# Patient Record
Sex: FEMALE | Race: WHITE | NOT HISPANIC OR LATINO | Employment: OTHER | URBAN - NONMETROPOLITAN AREA
[De-identification: names, ages, dates, MRNs, and addresses within clinical notes are randomized per-mention and may not be internally consistent; named-entity substitution may affect disease eponyms.]

---

## 2018-02-08 ENCOUNTER — OFFICE VISIT CONVERTED (OUTPATIENT)
Dept: FAMILY MEDICINE CLINIC | Age: 83
End: 2018-02-08
Attending: FAMILY MEDICINE

## 2018-06-25 ENCOUNTER — OFFICE VISIT CONVERTED (OUTPATIENT)
Dept: FAMILY MEDICINE CLINIC | Age: 83
End: 2018-06-25
Attending: FAMILY MEDICINE

## 2018-12-18 ENCOUNTER — OFFICE VISIT CONVERTED (OUTPATIENT)
Dept: FAMILY MEDICINE CLINIC | Age: 83
End: 2018-12-18
Attending: FAMILY MEDICINE

## 2018-12-18 ENCOUNTER — CONVERSION ENCOUNTER (OUTPATIENT)
Dept: OTHER | Facility: HOSPITAL | Age: 83
End: 2018-12-18

## 2018-12-20 ENCOUNTER — OFFICE VISIT CONVERTED (OUTPATIENT)
Dept: FAMILY MEDICINE CLINIC | Age: 83
End: 2018-12-20
Attending: NURSE PRACTITIONER

## 2018-12-31 ENCOUNTER — OFFICE VISIT CONVERTED (OUTPATIENT)
Dept: FAMILY MEDICINE CLINIC | Age: 83
End: 2018-12-31
Attending: FAMILY MEDICINE

## 2019-06-10 ENCOUNTER — HOSPITAL ENCOUNTER (OUTPATIENT)
Dept: OTHER | Facility: HOSPITAL | Age: 84
Discharge: HOME | End: 2019-06-10
Attending: FAMILY MEDICINE

## 2019-06-10 ENCOUNTER — OFFICE VISIT CONVERTED (OUTPATIENT)
Dept: FAMILY MEDICINE CLINIC | Age: 84
End: 2019-06-10
Attending: FAMILY MEDICINE

## 2019-06-10 LAB
ALBUMIN SERPL-MCNC: 4.4 G/DL (ref 3.5–5)
ALBUMIN/GLOB SERPL: 1.6 {RATIO} (ref 1.4–2.6)
ALP SERPL-CCNC: 71 U/L (ref 43–160)
ALT SERPL-CCNC: 21 U/L (ref 10–40)
ANION GAP SERPL CALC-SCNC: 14 MMOL/L (ref 8–19)
AST SERPL-CCNC: 19 U/L (ref 15–50)
BILIRUB SERPL-MCNC: 0.71 MG/DL (ref 0.2–1.3)
BUN SERPL-MCNC: 15 MG/DL (ref 5–25)
BUN/CREAT SERPL: 18 {RATIO} (ref 6–20)
CALCIUM SERPL-MCNC: 9.8 MG/DL (ref 8.7–10.4)
CHLORIDE SERPL-SCNC: 97 MMOL/L (ref 99–111)
CHOLEST SERPL-MCNC: 142 MG/DL (ref 107–200)
CHOLEST/HDLC SERPL: 2 {RATIO} (ref 3–6)
CONV CO2: 25 MMOL/L (ref 22–32)
CONV TOTAL PROTEIN: 7.1 G/DL (ref 6.3–8.2)
CREAT UR-MCNC: 0.84 MG/DL (ref 0.5–0.9)
ERYTHROCYTE [DISTWIDTH] IN BLOOD BY AUTOMATED COUNT: 12.9 % (ref 11.5–14.5)
GFR SERPLBLD BASED ON 1.73 SQ M-ARVRAT: >60 ML/MIN/{1.73_M2}
GLOBULIN UR ELPH-MCNC: 2.7 G/DL (ref 2–3.5)
GLUCOSE SERPL-MCNC: 101 MG/DL (ref 65–99)
HBA1C MFR BLD: 13.3 G/DL (ref 12–16)
HCT VFR BLD AUTO: 38.4 % (ref 37–47)
HDLC SERPL-MCNC: 70 MG/DL (ref 40–60)
IRON SERPL-MCNC: 97 UG/DL (ref 60–170)
LDLC SERPL CALC-MCNC: 61 MG/DL (ref 70–100)
MCH RBC QN AUTO: 32.7 PG (ref 27–31)
MCHC RBC AUTO-ENTMCNC: 34.6 G/DL (ref 33–37)
MCV RBC AUTO: 94.3 FL (ref 81–99)
OSMOLALITY SERPL CALC.SUM OF ELEC: 275 MOSM/KG (ref 273–304)
PLATELET # BLD AUTO: 166 10*3/UL (ref 130–400)
PMV BLD AUTO: 9.3 FL (ref 7.4–10.4)
POTASSIUM SERPL-SCNC: 4.1 MMOL/L (ref 3.5–5.3)
RBC # BLD AUTO: 4.07 10*6/UL (ref 4.2–5.4)
SODIUM SERPL-SCNC: 132 MMOL/L (ref 135–147)
TRIGL SERPL-MCNC: 56 MG/DL (ref 40–150)
TSH SERPL-ACNC: 1.71 M[IU]/L (ref 0.27–4.2)
VLDLC SERPL-MCNC: 11 MG/DL (ref 5–37)
WBC # BLD AUTO: 4.45 10*3/UL (ref 4.8–10.8)

## 2019-09-23 ENCOUNTER — OFFICE VISIT CONVERTED (OUTPATIENT)
Dept: FAMILY MEDICINE CLINIC | Age: 84
End: 2019-09-23
Attending: NURSE PRACTITIONER

## 2019-12-20 ENCOUNTER — HOSPITAL ENCOUNTER (OUTPATIENT)
Dept: OTHER | Facility: HOSPITAL | Age: 84
Discharge: HOME | End: 2019-12-20
Attending: FAMILY MEDICINE

## 2019-12-20 LAB
ALBUMIN SERPL-MCNC: 4.2 G/DL (ref 3.5–5)
ALBUMIN/GLOB SERPL: 1.6 {RATIO} (ref 1.4–2.6)
ALP SERPL-CCNC: 75 U/L (ref 43–160)
ALT SERPL-CCNC: 27 U/L (ref 10–40)
ANION GAP SERPL CALC-SCNC: 14 MMOL/L (ref 8–19)
AST SERPL-CCNC: 21 U/L (ref 15–50)
BILIRUB SERPL-MCNC: 0.44 MG/DL (ref 0.2–1.3)
BUN SERPL-MCNC: 18 MG/DL (ref 5–25)
BUN/CREAT SERPL: 22 {RATIO} (ref 6–20)
CALCIUM SERPL-MCNC: 9.2 MG/DL (ref 8.7–10.4)
CHLORIDE SERPL-SCNC: 102 MMOL/L (ref 99–111)
CHOLEST SERPL-MCNC: 145 MG/DL (ref 107–200)
CHOLEST/HDLC SERPL: 2.4 {RATIO} (ref 3–6)
CONV CO2: 27 MMOL/L (ref 22–32)
CONV TOTAL PROTEIN: 6.8 G/DL (ref 6.3–8.2)
CREAT UR-MCNC: 0.81 MG/DL (ref 0.5–0.9)
GFR SERPLBLD BASED ON 1.73 SQ M-ARVRAT: >60 ML/MIN/{1.73_M2}
GLOBULIN UR ELPH-MCNC: 2.6 G/DL (ref 2–3.5)
GLUCOSE SERPL-MCNC: 95 MG/DL (ref 65–99)
HDLC SERPL-MCNC: 61 MG/DL (ref 40–60)
IRON SATN MFR SERPL: 19 % (ref 20–55)
IRON SERPL-MCNC: 52 UG/DL (ref 60–170)
LDLC SERPL CALC-MCNC: 75 MG/DL (ref 70–100)
OSMOLALITY SERPL CALC.SUM OF ELEC: 290 MOSM/KG (ref 273–304)
POTASSIUM SERPL-SCNC: 4 MMOL/L (ref 3.5–5.3)
SODIUM SERPL-SCNC: 139 MMOL/L (ref 135–147)
TIBC SERPL-MCNC: 277 UG/DL (ref 245–450)
TRANSFERRIN SERPL-MCNC: 194 MG/DL (ref 250–380)
TRIGL SERPL-MCNC: 44 MG/DL (ref 40–150)
TSH SERPL-ACNC: 2.9 M[IU]/L (ref 0.27–4.2)
VLDLC SERPL-MCNC: 9 MG/DL (ref 5–37)

## 2020-01-29 ENCOUNTER — HOSPITAL ENCOUNTER (OUTPATIENT)
Dept: OTHER | Facility: HOSPITAL | Age: 85
Discharge: HOME | End: 2020-01-29
Attending: FAMILY MEDICINE

## 2020-01-29 ENCOUNTER — OFFICE VISIT CONVERTED (OUTPATIENT)
Dept: FAMILY MEDICINE CLINIC | Age: 85
End: 2020-01-29
Attending: FAMILY MEDICINE

## 2020-01-29 LAB
APPEARANCE UR: CLEAR
BACTERIA UR QL AUTO: ABNORMAL
BILIRUB UR QL: NEGATIVE
CASTS URNS QL MICRO: ABNORMAL /[LPF]
COLOR UR: YELLOW
CONV LEUKOCYTE ESTERASE: ABNORMAL
CONV UROBILINOGEN IN URINE BY AUTOMATED TEST STRIP: 0.2 {EHRLICHU}/DL (ref 0.1–1)
EPI CELLS #/AREA URNS HPF: ABNORMAL /[HPF]
GLUCOSE 24H UR-MCNC: NEGATIVE MG/DL
HGB UR QL STRIP: NEGATIVE
KETONES UR QL STRIP: ABNORMAL MG/DL
MUCOUS THREADS URNS QL MICRO: ABNORMAL
NITRITE UR-MCNC: NEGATIVE MG/ML
PH UR STRIP.AUTO: 6 [PH] (ref 5–8)
PROT UR-MCNC: NEGATIVE MG/DL
RBC # BLD AUTO: ABNORMAL /[HPF]
SP GR UR STRIP: 1.02 (ref 1–1.03)
SPECIMEN SOURCE: ABNORMAL
UNIDENT CRYS URNS QL MICRO: ABNORMAL /[HPF]
WBC #/AREA URNS HPF: ABNORMAL /[HPF]

## 2020-07-30 ENCOUNTER — OFFICE VISIT CONVERTED (OUTPATIENT)
Dept: FAMILY MEDICINE CLINIC | Age: 85
End: 2020-07-30
Attending: FAMILY MEDICINE

## 2020-08-06 ENCOUNTER — OFFICE VISIT CONVERTED (OUTPATIENT)
Dept: FAMILY MEDICINE CLINIC | Age: 85
End: 2020-08-06
Attending: FAMILY MEDICINE

## 2020-08-06 ENCOUNTER — HOSPITAL ENCOUNTER (OUTPATIENT)
Dept: OTHER | Facility: HOSPITAL | Age: 85
Discharge: HOME | End: 2020-08-06
Attending: FAMILY MEDICINE

## 2020-08-06 LAB
ALBUMIN SERPL-MCNC: 4.5 G/DL (ref 3.5–5)
ALBUMIN/GLOB SERPL: 1.7 {RATIO} (ref 1.4–2.6)
ALP SERPL-CCNC: 54 U/L (ref 43–160)
ALT SERPL-CCNC: 18 U/L (ref 10–40)
ANION GAP SERPL CALC-SCNC: 20 MMOL/L (ref 8–19)
AST SERPL-CCNC: 20 U/L (ref 15–50)
BILIRUB SERPL-MCNC: 0.72 MG/DL (ref 0.2–1.3)
BUN SERPL-MCNC: 10 MG/DL (ref 5–25)
BUN/CREAT SERPL: 9 {RATIO} (ref 6–20)
CALCIUM SERPL-MCNC: 10.7 MG/DL (ref 8.7–10.4)
CHLORIDE SERPL-SCNC: 101 MMOL/L (ref 99–111)
CHOLEST SERPL-MCNC: 265 MG/DL (ref 107–200)
CHOLEST/HDLC SERPL: 3.7 {RATIO} (ref 3–6)
CONV CO2: 24 MMOL/L (ref 22–32)
CONV TOTAL PROTEIN: 7.1 G/DL (ref 6.3–8.2)
CREAT UR-MCNC: 1.06 MG/DL (ref 0.5–0.9)
ERYTHROCYTE [DISTWIDTH] IN BLOOD BY AUTOMATED COUNT: 13.1 % (ref 11.5–14.5)
GFR SERPLBLD BASED ON 1.73 SQ M-ARVRAT: 49 ML/MIN/{1.73_M2}
GLOBULIN UR ELPH-MCNC: 2.6 G/DL (ref 2–3.5)
GLUCOSE SERPL-MCNC: 98 MG/DL (ref 65–99)
HBA1C MFR BLD: 13.1 G/DL (ref 12–16)
HCT VFR BLD AUTO: 39.8 % (ref 37–47)
HDLC SERPL-MCNC: 72 MG/DL (ref 40–60)
IRON SERPL-MCNC: 94 UG/DL (ref 60–170)
LDLC SERPL CALC-MCNC: 178 MG/DL (ref 70–100)
MCH RBC QN AUTO: 32.3 PG (ref 27–31)
MCHC RBC AUTO-ENTMCNC: 32.9 G/DL (ref 33–37)
MCV RBC AUTO: 98 FL (ref 81–99)
OSMOLALITY SERPL CALC.SUM OF ELEC: 289 MOSM/KG (ref 273–304)
PLATELET # BLD AUTO: 177 10*3/UL (ref 130–400)
PMV BLD AUTO: 9 FL (ref 7.4–10.4)
POTASSIUM SERPL-SCNC: 4.5 MMOL/L (ref 3.5–5.3)
RBC # BLD AUTO: 4.06 10*6/UL (ref 4.2–5.4)
SODIUM SERPL-SCNC: 140 MMOL/L (ref 135–147)
T4 FREE SERPL-MCNC: 1.5 NG/DL (ref 0.9–1.8)
TRIGL SERPL-MCNC: 75 MG/DL (ref 40–150)
TSH SERPL-ACNC: 1.96 M[IU]/L (ref 0.27–4.2)
VIT B12 SERPL-MCNC: 646 PG/ML (ref 211–911)
VLDLC SERPL-MCNC: 15 MG/DL (ref 5–37)
WBC # BLD AUTO: 4.57 10*3/UL (ref 4.8–10.8)

## 2020-08-25 ENCOUNTER — OFFICE VISIT CONVERTED (OUTPATIENT)
Dept: FAMILY MEDICINE CLINIC | Age: 85
End: 2020-08-25
Attending: FAMILY MEDICINE

## 2020-08-25 ENCOUNTER — HOSPITAL ENCOUNTER (OUTPATIENT)
Dept: OTHER | Facility: HOSPITAL | Age: 85
Discharge: HOME | End: 2020-08-25
Attending: FAMILY MEDICINE

## 2020-08-25 LAB
APPEARANCE UR: CLEAR
BACTERIA UR QL AUTO: ABNORMAL
BILIRUB UR QL: NEGATIVE
CASTS URNS QL MICRO: ABNORMAL /[LPF]
COLOR UR: YELLOW
CONV LEUKOCYTE ESTERASE: NEGATIVE
CONV UROBILINOGEN IN URINE BY AUTOMATED TEST STRIP: 0.2 {EHRLICHU}/DL (ref 0.1–1)
EPI CELLS #/AREA URNS HPF: ABNORMAL /[HPF]
GLUCOSE 24H UR-MCNC: NEGATIVE MG/DL
HGB UR QL STRIP: NEGATIVE
KETONES UR QL STRIP: NEGATIVE MG/DL
MUCOUS THREADS URNS QL MICRO: ABNORMAL
NITRITE UR-MCNC: NEGATIVE MG/ML
PH UR STRIP.AUTO: 7 [PH] (ref 5–8)
PROT UR-MCNC: NEGATIVE MG/DL
RBC # BLD AUTO: ABNORMAL /[HPF]
SP GR UR STRIP: 1.02 (ref 1–1.03)
SPECIMEN SOURCE: ABNORMAL
UNIDENT CRYS URNS QL MICRO: ABNORMAL /[HPF]
WBC #/AREA URNS HPF: ABNORMAL /[HPF]

## 2020-09-30 ENCOUNTER — HOSPITAL ENCOUNTER (OUTPATIENT)
Dept: OTHER | Facility: HOSPITAL | Age: 85
Discharge: HOME | End: 2020-09-30
Attending: FAMILY MEDICINE

## 2020-09-30 LAB
APPEARANCE UR: CLEAR
BACTERIA UR QL AUTO: ABNORMAL
BILIRUB UR QL: NEGATIVE
CASTS URNS QL MICRO: ABNORMAL /[LPF]
COLOR UR: YELLOW
CONV LEUKOCYTE ESTERASE: NEGATIVE
CONV UROBILINOGEN IN URINE BY AUTOMATED TEST STRIP: 0.2 {EHRLICHU}/DL (ref 0.1–1)
EPI CELLS #/AREA URNS HPF: ABNORMAL /[HPF]
GLUCOSE 24H UR-MCNC: NEGATIVE MG/DL
HGB UR QL STRIP: NEGATIVE
KETONES UR QL STRIP: NEGATIVE MG/DL
MUCOUS THREADS URNS QL MICRO: ABNORMAL
NITRITE UR-MCNC: NEGATIVE MG/ML
PH UR STRIP.AUTO: 6.5 [PH] (ref 5–8)
PROT UR-MCNC: NEGATIVE MG/DL
RBC # BLD AUTO: ABNORMAL /[HPF]
SP GR UR STRIP: 1.01 (ref 1–1.03)
SPECIMEN SOURCE: ABNORMAL
UNIDENT CRYS URNS QL MICRO: ABNORMAL /[HPF]
WBC #/AREA URNS HPF: ABNORMAL /[HPF]

## 2020-10-01 LAB
ANION GAP SERPL CALC-SCNC: 16 MMOL/L (ref 8–19)
BUN SERPL-MCNC: 15 MG/DL (ref 5–25)
BUN/CREAT SERPL: 15 {RATIO} (ref 6–20)
CALCIUM SERPL-MCNC: 10.2 MG/DL (ref 8.7–10.4)
CHLORIDE SERPL-SCNC: 98 MMOL/L (ref 99–111)
CONV CO2: 26 MMOL/L (ref 22–32)
CREAT UR-MCNC: 1 MG/DL (ref 0.5–0.9)
GFR SERPLBLD BASED ON 1.73 SQ M-ARVRAT: 52 ML/MIN/{1.73_M2}
GLUCOSE SERPL-MCNC: 107 MG/DL (ref 65–99)
OSMOLALITY SERPL CALC.SUM OF ELEC: 281 MOSM/KG (ref 273–304)
POTASSIUM SERPL-SCNC: 4.6 MMOL/L (ref 3.5–5.3)
SODIUM SERPL-SCNC: 135 MMOL/L (ref 135–147)

## 2020-10-06 LAB — PTH-INTACT SERPL-MCNC: 41 PG/ML (ref 15–65)

## 2021-01-28 ENCOUNTER — OFFICE VISIT CONVERTED (OUTPATIENT)
Dept: FAMILY MEDICINE CLINIC | Age: 86
End: 2021-01-28
Attending: FAMILY MEDICINE

## 2021-01-28 ENCOUNTER — HOSPITAL ENCOUNTER (OUTPATIENT)
Dept: OTHER | Facility: HOSPITAL | Age: 86
Discharge: HOME | End: 2021-01-28
Attending: FAMILY MEDICINE

## 2021-02-02 ENCOUNTER — HOSPITAL ENCOUNTER (OUTPATIENT)
Dept: OTHER | Facility: HOSPITAL | Age: 86
Discharge: HOME | End: 2021-02-02
Attending: FAMILY MEDICINE

## 2021-04-12 ENCOUNTER — HOSPITAL ENCOUNTER (OUTPATIENT)
Dept: OTHER | Facility: HOSPITAL | Age: 86
Discharge: HOME | End: 2021-04-12
Attending: FAMILY MEDICINE

## 2021-04-12 LAB
ANION GAP SERPL CALC-SCNC: 14 MMOL/L (ref 8–19)
BUN SERPL-MCNC: 15 MG/DL (ref 5–25)
BUN/CREAT SERPL: 19 {RATIO} (ref 6–20)
CALCIUM SERPL-MCNC: 9.3 MG/DL (ref 8.7–10.4)
CHLORIDE SERPL-SCNC: 103 MMOL/L (ref 99–111)
CONV CO2: 25 MMOL/L (ref 22–32)
CREAT UR-MCNC: 0.81 MG/DL (ref 0.5–0.9)
GFR SERPLBLD BASED ON 1.73 SQ M-ARVRAT: >60 ML/MIN/{1.73_M2}
GLUCOSE SERPL-MCNC: 100 MG/DL (ref 65–99)
OSMOLALITY SERPL CALC.SUM OF ELEC: 287 MOSM/KG (ref 273–304)
POTASSIUM SERPL-SCNC: 4.3 MMOL/L (ref 3.5–5.3)
SODIUM SERPL-SCNC: 138 MMOL/L (ref 135–147)

## 2021-04-20 ENCOUNTER — HOSPITAL ENCOUNTER (OUTPATIENT)
Dept: OTHER | Facility: HOSPITAL | Age: 86
Discharge: HOME | End: 2021-04-20
Attending: FAMILY MEDICINE

## 2021-05-18 NOTE — PROGRESS NOTES
Tracy Brooks 1935     Office/Outpatient Visit    Visit Date: Mon, Jun 25, 2018 12:53 pm    Provider: Cathy Lebron MD (Assistant: Vinita Trevino MA)    Location: [facility]        Electronically signed by Cathy Lebron MD on  06/26/2018 01:18:56 PM                             SUBJECTIVE:        CC:     Ms. Brooks is a 82 year old White female.  follow up for BP/cholesterol and thyroid disease;         HPI:         Patient to be evaluated for acquired hypothyroidism.  This was first diagnosed more than 6 months ago.  She is currently taking Levothyroid, 50 mcg daily.  TSH was last checked two months ago.  The result was reported as normal.  She denies any related symptoms.  She reports no symptoms suggestive of adverse medication effect.          Additionally, she presents with history of hypertension, controlled.  current nonpharmacologic treatment includes low sodium diet.  Her current cardiac medication regimen includes a diuretic, a calcium channel blocker, and an angiotensin receptor blocker.  She has not kept a blood pressure diary, but states that pressures have been well controlled.  She is tolerating the medication well without side effects.  Compliance with treatment has been good; she takes her medication as directed.          With regard to the essential hypercholesterolemia, current treatment includes Lipitor and a low cholesterol/low fat diet.  Compliance with treatment has been good; she takes her medication as directed.  She denies experiencing any hypercholesterolemia related symptoms.      she has chronic macular degeneration and glaucoma in her R eye, she is to follow up with Dr Rosales     iron def anemia, she is off iron supplementation     she still sees Dr Jackson yearly for her h/o breast cancer, continue chronic anastrazole     ROS:     CONSTITUTIONAL:  Negative for chills and fever.      CARDIOVASCULAR:  Negative for chest pain and palpitations.      RESPIRATORY:   Negative for recent cough and dyspnea.      GASTROINTESTINAL:  Negative for abdominal pain, nausea and vomiting.      INTEGUMENTARY/BREAST:  Negative for rash.      NEUROLOGICAL:  Positive for worsening memory, yamile recalling names, h/o 2 concussions-one , other .   Negative for dizziness, headaches or weakness.      PSYCHIATRIC:  Negative for anxiety and depression.          PMH/FMH/SH:     Last Reviewed on 2018 01:30 PM by Cathy Lebron    Past Medical History: ENT-DR BARROSO, OPTHOMOLOGIST-DR AKHTAR, CARDIOLOGY-DR FREEMAN    ONCOLOGY DR SARABIA, SURGERY DR ANDERSON, GI DR GARCIA macular degeneration                 PAST MEDICAL HISTORY         Hypertension     Gastroesophageal Reflux Disease     Osteoporosis: takes fortaeo;     has three thyroid nodules     Breast cancer: dx'd in Right with mastectomy , Left with mastectomy 2013;     eyes-macular degeneration eyes b, glaucoma L eye     Hip pain     Scalp laceration     Emphysema, other     Use of high risk medications     Unspecified fall     Dizziness     Arm pain     Osteoporosis     Acquired hypothyroidism     Generalized osteoarthritis     History of breast cancer     Osteopenia     Family history of colon cancer      Essential hypercholesterolemia     chronic back pain    GERD     Thyroid nodule     Knee pain     Shoulder pain     Multiple thoracic compression fractures         GYNECOLOGICAL HISTORY:        Menopause at age 42.              ADVANCE DIRECTIVES: Living will on file, signed 16, ./pr         PREVENTIVE HEALTH MAINTENANCE             BONE DENSITY: was last done 18 with the following abnormality noted-- osteoporosis - declines Prolia     COLORECTAL CANCER SCREENING: Up to date (colonoscopy q10y; sigmoidoscopy q5y; Cologuard q3y) was last done 3/20/18, Results are in chart; colonoscopy with the following abnormalities noted-- diverticulosis     PAP SMEAR: was last done  with normal results         Surgical  History:         Colon Resection: ; diveerticulitis;     bilateral mastectomy , ;      Coronary Artery Stent Placement: 2007; CARDIOLYTE STRESS TEST-NORMAL IN          Family History:     Father:  at age 82; Cause of death was cva     Mother:  at age 81; Cause of death was colon cancer     Brother(s): 1 brother(s) total;  Hyperlipidemia     Sister(s): 2 sister(s) total;  Hyperlipidemia     Positive for Ovarian Cancer ( mat. GM ).          Social History:         Household:  Lives with her lives alone.  Occupation: Retired (Prior occupation: )     Marital Status:      Children: 4 children         Tobacco/Alcohol/Supplements:     Last Reviewed on 2018 01:30 PM by Cathy Lebron    Tobacco: She has never smoked.          Alcohol:  Does not drink alcohol and never has.          Substance Abuse History:     Last Reviewed on 2016 10:43 AM by Kaila Delacruz        Mental Health History:     Last Reviewed on 2016 10:43 AM by Kaila Delacruz        Communicable Diseases (eg STDs):     Last Reviewed on 2016 10:43 AM by Kaila Delacruz            Allergies:     Last Reviewed on 2018 11:37 AM by Radha Simmons    Clindamycin HCl:    Penicillins:    Sulfas:    Codeine:    Cleocin:    Zithromax Z-Mayank:    Bactrim:        Current Medications:     Last Reviewed on 2018 11:37 AM by Radha Simmons    Amlodipine  5mg Tablet 1 tab daily     Atorvastatin Calcium 20mg Tablet 1 tab daily     Levothyroxine Sodium 50mcg Capsules 1 p.o. daily     Losartan/Hydrochlorothiazide 50mg/12.5mg Tablet 1 tab daily     Ferrous Sulfate 325mg Tablets 1 TAB DAILY     Aspirin 81mg Chewable Tablet one a day     Multivitamin/Mineral Supplement Tablet one a day     Anastrozole 1mg Tablet Take 1 tablet(s) by mouth daily     Senna     Timolol Maleate     OTC Fish Oil with Omega 3 1000mg/300mg take 2 daily     Calcium Citracel with Vitamin D3 500IU and Calcium  630mg daily     Vitamin D3 2,000IU Capsules 1 capsule daily         OBJECTIVE:        Vitals:         Current: 6/25/2018 12:59:18 PM    Ht:  5 ft, 2.5 in;  Wt: 134.7 lbs;  BMI: 24.2    T: 97.9 F (oral);  BP: 132/72 mm Hg (right arm, sitting);  P: 73 bpm (finger clip, sitting);  sCr: 0.87 mg/dL;  GFR: 48.60        Exams:     PHYSICAL EXAM:     GENERAL: vital signs recorded - well developed, well nourished;  no apparent distress;     EYES: PERRL, EOMI     E/N/T: EARS:  normal external auditory canals and tympanic membranes;  grossly normal hearing; OROPHARYNX:  normal mucosa, dentition, gingiva, and posterior pharynx;     NECK: range of motion is normal; thyroid is non-palpable;     RESPIRATORY: normal respiratory rate and pattern with no distress; normal breath sounds with no rales, rhonchi, wheezes or rubs;     CARDIOVASCULAR: normal rate; rhythm is regular;  no systolic murmur; 1+ pedal edema;     GASTROINTESTINAL: nontender, nondistended; no hepatosplenomegaly or masses; no bruits;     MUSCULOSKELETAL: gait: BALANCE is better, off walker;     NEUROLOGIC: mental status: oriented to person, place, and time;  Reflexes: knee jerks: 2+;  GROSSLY INTACT     PSYCHIATRIC: appropriate affect and demeanor; normal thought and perception;         ASSESSMENT           401.1   I10  Hypertension, controlled              DDx:     244.8   E03.8  Acquired hypothyroidism              DDx:     272.0   E78.00  Essential hypercholesterolemia              DDx:     275.42   E83.52  Hypercalcemia              DDx:     715.09   M15.0  Generalized osteoarthritis              DDx:     280.9   D50.9  Iron deficiency anemia              DDx:     362.50   H35.30  Macular degeneration              DDx:     V10.3   Z85.3  History of breast cancer              DDx:     492.8   J43.9  Emphysema, other              DDx:         ORDERS:         Lab Orders:       [id number]  [facility] PHYSICAL: CMP, CBC, TSH, LIPID: [id number], [id number], [id number], [id number]  (Send-Out)                    PLAN:          Hypertension, controlled stable and well controlled          Acquired hypothyroidism stable on  meds, meds refilled, due for labs     LABORATORY:  Labs ordered to be performed today include PHYSICAL PANEL; CMP, CBC, TSH, LIPID.            Orders:       [id number]  [facility] PHYSICAL: CMP, CBC, TSH, LIPID: [id number], [id number], [id number], [id number]  (Send-Out)            Essential hypercholesterolemia stable on lipitor          Hypercalcemia off calcium supplementation, due for labs          Generalized osteoarthritis cont topical NSAID          Iron deficiency anemia due for labs, she is on iron 3 days a week          Macular degeneration stable, f/u optometry          History of breast cancer cont meds and Dr Jackson yearly follow up          Emphysema, other stable             Other Orders:       [id number]  Office/outpatient visit; established patient, level 4  (In-House)           CHARGE CAPTURE           **Please note: ICD descriptions below are intended for billing purposes only and may not represent clinical diagnoses**        Primary Diagnosis:         401.1 Hypertension, controlled            I10    Essential (primary) hypertension    244.8 Acquired hypothyroidism            E03.8    Other specified hypothyroidism    272.0 Essential hypercholesterolemia            E78.00    Pure hypercholesterolemia, unspecified    275.42 Hypercalcemia            E83.52    Hypercalcemia    715.09 Generalized osteoarthritis            M15.0    Primary generalized (osteo)arthritis    280.9 Iron deficiency anemia            D50.9    Iron deficiency anemia, unspecified    362.50 Macular degeneration            H35.30    Unspecified macular degeneration    V10.3 History of breast cancer            Z85.3    Personal history of malignant neoplasm of breast    492.8 Emphysema, other            J43.9    Emphysema, unspecified        Other Orders:           [id number]   Office/outpatient visit; established patient, level 4   (In-House)

## 2021-05-18 NOTE — PROGRESS NOTES
Tracy Brooks 1935     Office/Outpatient Visit    Visit Date: Tue, Dec 18, 2018 10:51 am    Provider: Cathy Lebron MD (Assistant: Maria Del Rosario Baptiste MA)    Location: [facility]        Electronically signed by Cathy Lebron MD on  12/19/2018 04:18:37 PM                             SUBJECTIVE:        CC:     Ms. Brooks is a 83 year old White female.  Medicare Wellness Exam;         HPI:         Ms. Brooks is here for a Medicare wellness visit.  ADVANCE DIRECTIVES (Please update in PMH): Living will on file Returning to health checkup, the required HRA questions are integrated within this visit note. Family medical history and individual medical/surgical history were reviewed and updated.  A current height, weight, BMI, blood pressure, and pulse were recorded in the vitals section of the note and have been reviewed. Patient's medications, including supplements, were recorded in the chart and reviewed.  Current providers and suppliers were reviewed and updated.          Self-Assessment of Health: She rates her health as good. She rates her confidence of being able to control/manage most of her health problems as very confident. Her physical/emotional health has limited her social activites slightly.  A review of possible cognitive impairment was performed and the following was noted: she is not having trouble driving;  memory changes are noted;  she is not having trouble with her finances A review of functional ability and level of safety was performed and the following was noted: Bathing ( performs independently ); Dressing: ( performs independently ); Eating: ( performs independently ); Toilet use: ( performs independently ); Transferring: ( performs independently ); Urine and Bowel continence: ( Normal ) Falls Risk: Has not had any falls or only one fall without injury in the past year.  She denies having trouble hearing the TV/radio when others do not, having to strain to hear or understand  conversations and wearing hearing aid(s).  Concerning home safety, she reports that at home she DOES have adequate lighting, a skid resistant shower/tub, grab bars in the bath, handrails on stairs, functioning smoke alarms and absence of throw rugs.          Immunization Status: ** Has not received pneumococcal vaccination; ** Has not received influenza vaccine for this season; Age>60, no shingles vaccination; Physical Activity: She never excercises.; Type of diet patient normally eats is described as well-balanced with fruits and vegetables Tobacco: She has never smoked.  Preventative Health updated today         PHQ-9 Depression Screening: Completed form scanned and in chart; Total Score 2 Alcohol Consumption Screening: Completed form scanned and in chart; Total Score 0         Concerning acquired hypothyroidism, this was first diagnosed more than 6 months ago.  She is currently taking Levothyroid, 50 mcg daily.  TSH was last checked two months ago.  The result was reported as normal.  She denies any related symptoms.  She reports no symptoms suggestive of adverse medication effect.          Additionally, she presents with history of essential hypercholesterolemia.  current treatment includes Lipitor and a low cholesterol/low fat diet.  Compliance with treatment has been good; she takes her medication as directed.  She denies experiencing any hypercholesterolemia related symptoms.  Most recent lab tests include Creatinine, Serum:  0.86 (mg/dl) (06/25/2018), Glom Filt Rate, Est:  >60 (ml/min/1.73m2) (06/25/2018), Hemoglobin:  12.00 (gm/dl) (06/25/2018), Hematocrit:  35.5 (%) (06/25/2018), Total Cholesterol:  147 (mg/dL) (06/25/2018), HDL:  69 (mg/dL) (06/25/2018), Triglycerides:  58 (mg/dL) (06/25/2018), LDL:  66 (mg/dL) (06/25/2018), TSH:  1.850 (mIU/L) (06/25/2018), Calcium, Total:  10.5 (mg/dl) (06/25/2018), Alkaline Phosphatase, Serum:  88 (U/L) (06/25/2018), ALT (SGPT):  22 (U/L) (06/25/2018), AST (SGOT):  20  (U/L) (2018).      elevated calcium, she is off calcium supplementation     1 night of vertigo 2 weeks ago, this has resolved     ongoing poor balance, she has quit doing exercises, defers PT referral, uses a cane and hasnt fallen in over a year     ROS:     CONSTITUTIONAL:  Negative for chills and fever.      CARDIOVASCULAR:  Negative for chest pain and palpitations.      RESPIRATORY:  Negative for recent cough and dyspnea.      GASTROINTESTINAL:  Negative for abdominal pain, nausea and vomiting.      INTEGUMENTARY/BREAST:  Negative for rash.      NEUROLOGICAL:  Positive for worsening memory, yamile recalling names, h/o 2 concussions-one , other .   Negative for dizziness, headaches or weakness.      PSYCHIATRIC:  Negative for anxiety and depression.          PMH/FMH/SH:     Last Reviewed on 2018 11:32 AM by Cathy Lebron    Past Medical History: ENT-DR BARROSO, OPTHOMOLOGIST-DR AKHTAR, CARDIOLOGY-DR FREEMAN    ONCOLOGY DR SARABIA, SURGERY DR ANDERSON, GI DR GARCIA macular degeneration                 PAST MEDICAL HISTORY         Hypertension     Gastroesophageal Reflux Disease     Osteoporosis: takes fortaeo;     has three thyroid nodules     Breast cancer: dx'd in Right with mastectomy , Left with mastectomy 2013;     eyes-macular degeneration eyes b, glaucoma L eye     Hip pain     Scalp laceration     Emphysema, other     Use of high risk medications     Unspecified fall     Dizziness     Arm pain     Osteoporosis     Acquired hypothyroidism     Generalized osteoarthritis     History of breast cancer     Osteopenia     Family history of colon cancer      Essential hypercholesterolemia     chronic back pain    GERD     Thyroid nodule     Knee pain     Shoulder pain     Multiple thoracic compression fractures         GYNECOLOGICAL HISTORY:        Menopause at age 42.              ADVANCE DIRECTIVES: Living will on file, signed 16, ./pr         PREVENTIVE HEALTH MAINTENANCE              BONE DENSITY: was last done 18 with the following abnormality noted-- osteoporosis - declines Prolia     COLORECTAL CANCER SCREENING: Up to date (colonoscopy q10y; sigmoidoscopy q5y; Cologuard q3y) was last done 3/20/18, Results are in chart; colonoscopy with the following abnormalities noted-- diverticulosis     EYE EXAM: was last done 2018 macular degeneration and glaucoma     MAMMOGRAM: was last done  with normal results s/p double mastectomy     PAP SMEAR: was last done  with normal results         Surgical History:         Colon Resection: ; diveerticulitis;     bilateral mastectomy , ;      Coronary Artery Stent Placement: 2007; CARDIOLYTE STRESS TEST-NORMAL IN          Family History:     Father:  at age 82; Cause of death was cva     Mother:  at age 81; Cause of death was colon cancer     Brother(s): 1 brother(s) total;  Hyperlipidemia     Sister(s): 2 sister(s) total;  Hyperlipidemia     Positive for Ovarian Cancer ( mat. GM ).          Social History:         Household:  Lives with her lives alone.  Occupation: Retired (Prior occupation: )     Marital Status:      Children: 4 children         Tobacco/Alcohol/Supplements:     Last Reviewed on 2018 11:32 AM by Cathy Lebron    Tobacco: She has never smoked.          Alcohol:  Does not drink alcohol and never has.          Substance Abuse History:     Last Reviewed on 2016 10:43 AM by Kaila Delacruz        Mental Health History:     Last Reviewed on 2016 10:43 AM by Kaila Delacruz        Communicable Diseases (eg STDs):     Last Reviewed on 2016 10:43 AM by Kaila Delacruz            Immunizations:     zzPrevnar-13 2016         Allergies:     Last Reviewed on 2018 11:03 AM by Christine Beard    Clindamycin HCl:    Penicillins:    Sulfas:    Codeine:    Cleocin:    Zithromax Z-Mayank:    Bactrim:        Current Medications:     Last  Reviewed on 12/18/2018 11:12 AM by Christine Beard    Levothyroxine Sodium 50mcg Capsules 1 p.o. daily     Amlodipine  5mg Tablet 1 tab daily     Atorvastatin Calcium 20mg Tablet 1 tab daily     Losartan/Hydrochlorothiazide 50mg/12.5mg Tablet 1 tab daily     Ferrous Sulfate 325mg Tablets 1 TAB DAILY     Aspirin 81mg Chewable Tablet one a day     Multivitamin/Mineral Supplement Tablet one a day     Timolol Maleate     OTC Fish Oil with Omega 3 1000mg/300mg take 2 daily     Vitamin D3 2,000IU Capsules 1 capsule daily         OBJECTIVE:        Vitals:         Current: 12/18/2018 11:10:32 AM    Ht:  5 ft, 2.5 in;  Wt: 131.6 lbs;  BMI: 23.7    T: 97.8 F (oral);  BP: 185/94 mm Hg (left leg, sitting);  P: 74 bpm (left arm (BP Cuff), sitting);  sCr: 0.86 mg/dL;  GFR: 47.90    O2 Sat: 99 % (room air)    VA: 20/200 OD, 20/70 OS (near, without correction)        Exams:     PHYSICAL EXAM:     GENERAL: vital signs recorded - well developed, well nourished;  no apparent distress;     EYES: PERRL, EOMI     E/N/T: EARS:  normal external auditory canals and tympanic membranes;  grossly normal hearing; OROPHARYNX:  normal mucosa, dentition, gingiva, and posterior pharynx;     NECK: range of motion is normal; thyroid is non-palpable;     RESPIRATORY: normal respiratory rate and pattern with no distress; normal breath sounds with no rales, rhonchi, wheezes or rubs;     CARDIOVASCULAR: normal rate; rhythm is regular;  no systolic murmur; 1+ pedal edema;     GASTROINTESTINAL: nontender, nondistended; no hepatosplenomegaly or masses; no bruits; rectal exam: DEFERS;     GENITOURINARY: DEFERS;     BREAST/INTEGUMENT: BREAST-double mastectomy; skin-onychomycosis of toenails with extra long deformed L great toenail; (severe hallux valgus B)     MUSCULOSKELETAL: gait: slowed, stooped, wide-based, and uses a cane;     NEUROLOGIC: mental status: oriented to person, place, and time;  Reflexes: knee jerks: 2+;  GROSSLY INTACT     PSYCHIATRIC:  appropriate affect and demeanor; normal thought and perception;         Procedures:     Pneumovax administration     1. Pneumovax (pneumococcal PPSV23): 0.5 ml unit dose given IM in the right upper arm; administered by [facility], [place];  lot number [id number]; expires 2-27-20             ASSESSMENT           V70.0   Z00.00  Health checkup              DDx:     V79.0   Z13.89  Screening for depression              DDx:     733.09   M81.8  Osteoporosis              DDx:     244.8   E03.8  Acquired hypothyroidism              DDx:     401.1   I10  Hypertension, controlled              DDx:     272.0   E78.00  Essential hypercholesterolemia              DDx:     715.09   M15.0  Generalized osteoarthritis              DDx:     275.42   E83.52  Hypercalcemia              DDx:     492.8   J43.9  Emphysema, other              DDx:     362.50   H35.30  Macular degeneration              DDx:     V10.3   Z85.3  History of breast cancer              DDx:     780.4   H81.09  Vertigo              DDx:     781.3   R27.8  Coordination disturbance              DDx:     V03.82   Z23  Pneumovax administration              DDx:     280.9   D50.9  Iron deficiency anemia              DDx:     735.0   M20.10  Acquired hallux valgus              DDx:     110.1   B37.2  Toe onychomycosis              DDx:         ORDERS:         Radiology/Test Orders:       3014F  Screening mammography results documented and reviewed (PV)1  (In-House)         3017F  Colorectal CA screen results documented and reviewed (PV)  (In-House)         86056  DXA, bone density study, 1 or more sites; axial skeleton (eg hips, pelvis, spine)  (Send-Out)           Lab Orders:       [id number]  [facility] PHYSICAL: CMP, CBC, TSH, LIPID: [id number], [id number], [id number], [id number]  (Send-Out)           Procedures Ordered:         Annual wellness visit, includes a PPPS, subsequent visit  (In-House)         59721  PNEUMOVAX 23  (In-House)           Other Orders:         Depression screen negative   (In-House)         1101F  Pt screen for fall risk; document no falls in past year or only 1 fall w/o injury in past year (TIMMY)  (In-House)           Negative EtOH screen  (In-House)           Administration of pneumococcal vaccine (x1)                 PLAN:          Health checkup MEDICARE WELLNESS EXAM-SHE IS DONE WITH MAMMOGRAM (double mastectomy), DONE WITH PAP/PELVIC, UTD ON  DEXA-NEEDS PROLIA AND SHE DEFERS, UTD ON COLONOSCOPY DONE 2018 AND IS DONE WITH THESE, DEFERS FLU/SHINGLES, PNUEMONIA/TD AND PREVNAR ARE UTD, LONG STANDING FALL RISK-SHE USES A WALKER AND A CANE, MILD MEMORY ISSUES IS STABLE, NO DEPRESSION, SHE LIVES ALONE BUT HAS GREAT FAMILY SUPPORT-CAMERA SYSTEM IN HOME FOR CLOSE MONITORING, SHE IS DRIVING, ABLE TO  PERFORM ADL'S AND FINANCES, SHE NOW HAS A  LIVING WILL, [employer] AND  [employer] HANDOUT WAS GIVEN TO HER. MD LIST UPDATED     LABORATORY:  Labs ordered to be performed today include PHYSICAL PANEL; CMP, CBC, TSH, LIPID.            Orders:         Annual wellness visit, includes a PPPS, subsequent visit  (In-House)         29060  [facility] PHYSICAL: CMP, CBC, TSH, LIPID: [id number], [id number], [id number], [id number]  (Send-Out)            Screening for depression     MIPS Negative Depression Screen Negative alcohol screen     MAMMOGRAM: Done within last 2 years and results in are chart     COLORECTAL CANCER SCREENING: Results are in chart           Orders:         Depression screen negative  (In-House)         1101F  Pt screen for fall risk; document no falls in past year or only 1 fall w/o injury in past year (TIMMY)  (In-House)           Negative EtOH screen  (In-House)         3014F  Screening mammography results documented and reviewed (PV)1  (In-House)         3017F  Colorectal CA screen results documented and reviewed (PV)  (In-House)            Osteoporosis due for dexa           Orders:       97723  DXA, bone density study, 1 or more sites; axial skeleton (eg hips, pelvis,  spine)  (Send-Out)            Acquired hypothyroidism stable on meds, due for labs          Essential hypercholesterolemia stable on atorvastatin          Generalized osteoarthritis stable          Hypercalcemia off calcium supplementation, will repeat labs          Emphysema, other stable, needs pneumovax 23 updated          Macular degeneration cont close f/u with opthamology          History of breast cancer s/p double mastectomy, she sees Dr Jackson          Vertigo resolved          Coordination disturbance cont using cane and walker, she defers PT referral          Pneumovax administration           Orders:       [id number]  PNEUMOVAX 23  (In-House)                     Administration of pneumococcal vaccine (x1)          Iron deficiency anemia on iron daily, will recheck labs          Acquired hallux valgus recommend podiatry referral and she defers today, will call back if she changes her mind          Toe onychomycosis recommend podiatry referral to trim nails and she defers today, will call back if she changes her mind             CHARGE CAPTURE           **Please note: ICD descriptions below are intended for billing purposes only and may not represent clinical diagnoses**        Primary Diagnosis:         V70.0 Health checkup            Z00.00    Encounter for general adult medical examination without abnormal findings              Orders:          98614   Preventive medicine, established patient, age 65+ years  (In-House)                Annual wellness visit, includes a PPPS, subsequent visit  (In-House)           V79.0 Screening for depression            Z13.89    Encounter for screening for other disorder              Orders:          04295 -25  Office/outpatient visit; established patient, level 4  (In-House)                Depression screen negative  (In-House)             1101F   Pt screen for fall risk; document no falls in past year or only 1 fall w/o injury in past year (TIMMY)  (In-House)                 Negative EtOH screen  (In-House)             3014F   Screening mammography results documented and reviewed (PV)1  (In-House)             3017F   Colorectal CA screen results documented and reviewed (PV)  (In-House)           733.09 Osteoporosis            M81.8    Other osteoporosis without current pathological fracture    244.8 Acquired hypothyroidism            E03.8    Other specified hypothyroidism    401.1 Hypertension, controlled            I10    Essential (primary) hypertension    272.0 Essential hypercholesterolemia            E78.00    Pure hypercholesterolemia, unspecified    715.09 Generalized osteoarthritis            M15.0    Primary generalized (osteo)arthritis    275.42 Hypercalcemia            E83.52    Hypercalcemia    492.8 Emphysema, other            J43.9    Emphysema, unspecified    362.50 Macular degeneration            H35.30    Unspecified macular degeneration    V10.3 History of breast cancer            Z85.3    Personal history of malignant neoplasm of breast    780.4 Vertigo            H81.09    Meniere's disease, unspecified ear    781.3 Coordination disturbance            R27.8    Other lack of coordination    V03.82 Pneumovax administration            Z23    Encounter for immunization              Orders:          20935   PNEUMOVAX 23  (In-House)                                           Administration of pneumococcal vaccine (x1)         280.9 Iron deficiency anemia            D50.9    Iron deficiency anemia, unspecified    735.0 Acquired hallux valgus            M20.10    Hallux valgus (acquired), unspecified foot    110.1 Toe onychomycosis            B37.2    Candidiasis of skin and nail

## 2021-05-18 NOTE — PROGRESS NOTES
Tracy Brooks 1935     Office/Outpatient Visit    Visit Date: Thu, Dec 20, 2018 06:36 pm    Provider: Ana Sage N.P. (Assistant: Salina Sandhu MA)    Location: [facility]        Electronically signed by Ana Sage N.P. on  12/21/2018 09:28:55 AM                             SUBJECTIVE:        CC:     Ms. Brooks is a 83 year old White female.  presents today due to complaints of vertigo X 1 day         HPI:         Patient to be evaluated for vertigo.  Pt states vertigo episodes. She had one 2 weeks ago and then went away. Her daughter bought her meclizine otc but she read to not take it d/t having glaucoma. States  when she moves to the side or stands up, she gets dizzy. She has a lot of adis stuff to do but can't bc of the dizziness. She sat around a lot yesterday.      ROS:     CONSTITUTIONAL:  Negative for chills, fatigue, fever, and weight change.      EYES:  Negative for blurred vision.      CARDIOVASCULAR:  Negative for chest pain, orthopnea, paroxysmal nocturnal dyspnea and pedal edema.      RESPIRATORY:  Negative for dyspnea.      GASTROINTESTINAL:  Negative for abdominal pain, constipation, diarrhea, nausea and vomiting.      NEUROLOGICAL:  Positive for dizziness.      PSYCHIATRIC:  Negative for anxiety, depression, and sleep disturbances.          PMH/FMH/SH:     Last Reviewed on 12/20/2018 06:54 PM by Ana Sage    Past Medical History: ENT-DR BARROSO, OPTHOMOLOGIST-DR AKHTAR, CARDIOLOGY-DR FREEMAN    ONCOLOGY DR SARABIA, SURGERY DR ANDERSON, GI DR GARCIA macular degeneration                 PAST MEDICAL HISTORY         Hypertension     Gastroesophageal Reflux Disease     Osteoporosis: takes fortaeo;     has three thyroid nodules     Breast cancer: dx'd in Right with mastectomy 2012, Left with mastectomy 2/2013;     eyes-macular degeneration eyes b, glaucoma L eye     Hip pain     Scalp laceration     Emphysema, other     Use of high risk medications      Unspecified fall     Dizziness     Arm pain     Osteoporosis     Acquired hypothyroidism     Generalized osteoarthritis     History of breast cancer     Osteopenia     Family history of colon cancer      Essential hypercholesterolemia     chronic back pain    GERD     Thyroid nodule     Knee pain     Shoulder pain     Multiple thoracic compression fractures         GYNECOLOGICAL HISTORY:        Menopause at age 42.              ADVANCE DIRECTIVES: Living will on file, signed 16, ./pr         PREVENTIVE HEALTH MAINTENANCE             BONE DENSITY: was last done 18 with the following abnormality noted-- osteoporosis - declines Prolia     COLORECTAL CANCER SCREENING: Up to date (colonoscopy q10y; sigmoidoscopy q5y; Cologuard q3y) was last done 3/20/18, Results are in chart; colonoscopy with the following abnormalities noted-- diverticulosis     EYE EXAM: was last done 2018 macular degeneration and glaucoma     MAMMOGRAM: was last done  with normal results s/p double mastectomy     PAP SMEAR: was last done  with normal results         Surgical History:         Colon Resection: ; diveerticulitis;     bilateral mastectomy , ;      Coronary Artery Stent Placement: 2007; CARDIOLYTE STRESS TEST-NORMAL IN          Family History:     Father:  at age 82; Cause of death was cva     Mother:  at age 81; Cause of death was colon cancer     Brother(s): 1 brother(s) total;  Hyperlipidemia     Sister(s): 2 sister(s) total;  Hyperlipidemia     Positive for Ovarian Cancer ( mat. GM ).          Social History:         Household:  Lives with her lives alone.  Occupation: Retired (Prior occupation: )     Marital Status:      Children: 4 children         Tobacco/Alcohol/Supplements:     Last Reviewed on 2018 06:54 PM by Ana Sage    Tobacco: She has never smoked.          Alcohol:  Does not drink alcohol and never has.          Substance Abuse  History:     Last Reviewed on 12/20/2018 06:54 PM by Aan Sage        Mental Health History:     Last Reviewed on 12/20/2018 06:54 PM by Ana Sage        Communicable Diseases (eg STDs):     Last Reviewed on 12/20/2018 06:54 PM by Ana Sage            Current Problems:     Last Reviewed on 12/20/2018 06:54 PM by Ana Sage    Acquired hallux valgus     Vertigo     Essential hypercholesterolemia     Macular degeneration     Memory loss     Hypercalcemia     Emphysema, other     Osteoporosis     Acquired hypothyroidism     Generalized osteoarthritis     History of breast cancer     Family history of colon cancer     Hypertension, controlled     GERD     Thyroid nodule     Toe onychomycosis     Iron deficiency anemia     Coordination disturbance     Screening for depression         Immunizations:     zzPrevnar-13 11/28/2016     PNEUMOVAX 23 (Pneumococcal PPV23) 12/18/2018         Allergies:     Last Reviewed on 12/20/2018 06:54 PM by Ana Sage    Clindamycin HCl:    Penicillins:    Sulfas:    Codeine:    Cleocin:    Zithromax Z-Mayank:    Bactrim:        Current Medications:     Last Reviewed on 12/20/2018 06:54 PM by Ana Sage    Levothyroxine Sodium 50mcg Capsules 1 p.o. daily     Amlodipine  5mg Tablet 1 tab daily     Atorvastatin Calcium 20mg Tablet 1 tab daily     Losartan/Hydrochlorothiazide 50mg/12.5mg Tablet 1 tab daily     Ferrous Sulfate 325mg Tablets 1 TAB DAILY     Aspirin 81mg Chewable Tablet one a day     Multivitamin/Mineral Supplement Tablet one a day     Timolol Maleate     OTC Fish Oil with Omega 3 1000mg/300mg take 2 daily     Vitamin D3 2,000IU Capsules 1 capsule daily         OBJECTIVE:        Vitals:         Current: 12/20/2018 6:40:52 PM    Ht:  5 ft, 2.5 in;  Wt: 131.4 lbs;  BMI: 23.7    T: 97.9 F (oral);  BP: 133/62 mm Hg (left leg, sitting);  P: 86 bpm (left arm (BP Cuff), sitting);  sCr: 0.84 mg/dL;  GFR: 49.01        Exams:     PHYSICAL EXAM:      GENERAL: vital signs recorded - well developed, well nourished;  no apparent distress;     EYES: extraocular movements intact; conjunctiva and cornea are normal; PERRLA;     E/N/T: EARS: external auditory canal occluded by cerumen on the left and;  L EAC clear after irrigation.;     NECK: range of motion is normal; thyroid is non-palpable;     RESPIRATORY: normal respiratory rate and pattern with no distress; normal breath sounds with no rales, rhonchi, wheezes or rubs;     CARDIOVASCULAR: normal rate; rhythm is regular;  no systolic murmur; no edema;     GASTROINTESTINAL: nontender; normal bowel sounds; no organomegaly;     NEUROLOGICAL:  cranial nerves, motor and sensory function, reflexes, gait and coordination are all intact;     PSYCHIATRIC:  appropriate affect and demeanor; normal speech pattern; grossly normal memory;         ASSESSMENT:           780.4   H81.09  Vertigo              DDx:         PLAN:          Vertigo Epley procedure handout given. Pt will think about PT.         FOLLOW-UP: Advised to call if there is no improvement..   Chronic visit follow up           Prescriptions: Meclizine contraindicated d/t glaucoma and increased pressure risk.           Patient Education Handouts:       Benign Paroxysmal Positional Vertigo (Bppv)              Patient Recommendations:        For  Vertigo:                     APPOINTMENT INFORMATION:        Monday Tuesday Wednesday Thursday Friday Saturday Sunday            Time:___________________AM  PM   Date:_____________________             CHARGE CAPTURE:           Primary Diagnosis:     780.4 Vertigo            H81.09    Meniere's disease, unspecified ear              Orders:          37821   Office/outpatient visit; established patient, level 3  (In-House)

## 2021-05-18 NOTE — PROGRESS NOTES
Tracy Brooks 1935     Office/Outpatient Visit    Visit Date: Mon, Sep 23, 2019 03:15 pm    Provider: Nuria Olivera N.P. (Assistant: Sarah Spurling, MA)    Location: [facility]        Electronically signed by Nuria Olivera N.P. on  09/24/2019 12:58:40 PM                             SUBJECTIVE:        CC:     Ms. Brooks is a 83 year old White female.  right leg pain;         HPI:         Knee pain noted.  The patient notes joint pain.  This has been a problem for the past 2 weeks.  Symptoms have been waxes and waes in intensity.  Primary joints affected include: right knee.  Aggravating factors include was recently on feet and climbing steps more than usual - feels somewhat better today.  Associated symptoms include: and pain radiating down into the back of the leg.  She denies associated crepitation.  The patient has tried application of cold packs ( with fair results ).      ROS:     CONSTITUTIONAL:  Negative for chills, fatigue, fever, and weight change.      CARDIOVASCULAR:  Negative for chest pain, orthopnea, paroxysmal nocturnal dyspnea and pedal edema.      RESPIRATORY:  Negative for dyspnea.      MUSCULOSKELETAL:  Positive for joint stiffness (right knee).      NEUROLOGICAL:  Negative for paresthesias.          [facility]/FM/SH:     Last Reviewed on 6/10/2019 11:24 AM by Cathy Lebron    Past Medical History: ENT-DR BARROSO, OPTHOMOLOGIST-DR AKHTAR, CARDIOLOGY-DR FREEMAN    ONCOLOGY DR SARABIA, SURGERY DR ANDERSON, GI DR GARCIA macular degeneration                 PAST MEDICAL HISTORY         Hypertension     Gastroesophageal Reflux Disease     Osteoporosis: takes fortaeo;     has three thyroid nodules     Breast cancer: dx'd in Right with mastectomy 2012, Left with mastectomy 2/2013;     eyes-macular degeneration eyes b, glaucoma L eye     Hip pain     Scalp laceration     Emphysema, other     Use of high risk medications     Unspecified fall     Dizziness     Arm pain      Osteoporosis     Acquired hypothyroidism     Generalized osteoarthritis     History of breast cancer     Osteopenia     Family history of colon cancer      Essential hypercholesterolemia     chronic back pain    GERD     Thyroid nodule     Knee pain     Shoulder pain     Multiple thoracic compression fractures         GYNECOLOGICAL HISTORY:        Menopause at age 42.              ADVANCE DIRECTIVES: Living will on file, signed 16, ./pr         PREVENTIVE HEALTH MAINTENANCE             BONE DENSITY: was last done 18 with the following abnormality noted-- osteoporosis - declines Prolia     COLORECTAL CANCER SCREENING: Up to date (colonoscopy q10y; sigmoidoscopy q5y; Cologuard q3y) was last done 3/20/18, Results are in chart; colonoscopy with the following abnormalities noted-- diverticulosis     EYE EXAM: was last done 2018 macular degeneration and glaucoma     MAMMOGRAM: was last done  with normal results s/p double mastectomy     PAP SMEAR: was last done  with normal results     Prolia Injection : 1st injection 19         Surgical History:         Colon Resection: ; diveerticulitis;     bilateral mastectomy , ;      Coronary Artery Stent Placement: 2007; CARDIOLYTE STRESS TEST-NORMAL IN          Family History:     Father:  at age 82; Cause of death was cva     Mother:  at age 81; Cause of death was colon cancer     Brother(s): 1 brother(s) total;  Hyperlipidemia     Sister(s): 2 sister(s) total;  Hyperlipidemia     Positive for Ovarian Cancer ( mat. GM ).          Social History:         Household:  Lives with her lives alone.  Occupation: Retired (Prior occupation: )     Marital Status:      Children: 4 children         Tobacco/Alcohol/Supplements:     Last Reviewed on 2019 03:19 PM by Spurling, Sarah C    Tobacco: She has never smoked.          Alcohol:  Does not drink alcohol and never has.          Substance Abuse History:      Last Reviewed on 12/20/2018 06:54 PM by Ana Sage        Mental Health History:     Last Reviewed on 12/20/2018 06:54 PM by Ana Sage        Communicable Diseases (eg STDs):     Last Reviewed on 12/20/2018 06:54 PM by Ana Sage            Current Problems:     Last Reviewed on 12/20/2018 06:54 PM by Ana Sage    Personal history of other drug therapy     Postmenopausal osteoporosis     Osteoporosis, other     History of fall     Vertigo     Acquired hallux valgus     Essential hypercholesterolemia     Macular degeneration     Memory loss     Hypercalcemia     Emphysema, other     Osteoporosis     Acquired hypothyroidism     Generalized osteoarthritis     History of breast cancer     Family history of colon cancer     Hypertension, controlled     GERD     Thyroid nodule     Knee pain         Immunizations:     zzPrevnar-13 11/28/2016     PNEUMOVAX 23 (Pneumococcal PPV23) 12/18/2018         Allergies:     Last Reviewed on 9/23/2019 03:19 PM by Spurling, Sarah C    Clindamycin HCl:    Penicillins:    Sulfas:    Codeine:    Cleocin:    Zithromax Z-Mayank:    Bactrim:        Current Medications:     Last Reviewed on 9/23/2019 03:20 PM by Spurling, Sarah C    Losartan 50mg Tablet 1 tab daily     Amlodipine  5mg Tablet 1 tab daily     Atorvastatin Calcium 20mg Tablet 1 tab daily     Levothyroxine Sodium 50mcg Capsules 1 p.o. daily     Ferrous Sulfate 325mg Tablets 1 TAB DAILY     Aspirin 81mg Chewable Tablet one a day     Multivitamin/Mineral Supplement Tablet one a day     Prolia 60mg/1ml Injection every 6 months     Timolol Maleate     OTC Fish Oil with Omega 3 1000mg/300mg take 2 daily     Vitamin D3 2,000IU Capsules 1 capsule daily         OBJECTIVE:        Vitals:         Current: 9/23/2019 3:21:08 PM    Ht:  5 ft, 2.5 in;  Wt: 133 lbs;  BMI: 23.9    T: 98.1 F (oral);  BP: 128/60 mm Hg (left arm, sitting);  P: 85 bpm (left arm (BP Cuff), sitting);  sCr: 0.84 mg/dL;  GFR: 49.26         Exams:     PHYSICAL EXAM:     GENERAL: vital signs recorded - well developed, well nourished;  no apparent distress;     RESPIRATORY: normal respiratory rate and pattern with no distress; normal breath sounds with no rales, rhonchi, wheezes or rubs;     CARDIOVASCULAR: normal rate; rhythm is regular;  no systolic murmur; no edema;     MUSCULOSKELETAL: normal gait; normal range of motion of all major muscle groups; no limb or joint pain with range of motion;     NEUROLOGICAL:  cranial nerves, motor and sensory function, reflexes, gait and coordination are all intact;     PSYCHIATRIC:  appropriate affect and demeanor; normal speech pattern; grossly normal memory;         ASSESSMENT:           719.46   M25.561  Knee pain              DDx:         ORDERS:         Meds Prescribed:       Voltaren  (Diclofenac Sodium) 1% Topical Gel Apply 4 g to affected area up to 4 times a day  #1 (One) gm Refills: 2                 PLAN:          Knee pain would recommend continue with ice applications several times per day - try to stay off as much as possible - may consider PT if continues ;  Follow up if new or worsening symptoms (swelling, redness, weakness)           Prescriptions:       Voltaren  (Diclofenac Sodium) 1% Topical Gel Apply 4 g to affected area up to 4 times a day  #1 (One) gm Refills: 2             CHARGE CAPTURE:           Primary Diagnosis:     719.46 Knee pain            M25.561    Pain in right knee              Orders:          [id number]   Office/outpatient visit; established patient, level 3  (In-House)

## 2021-05-18 NOTE — PROGRESS NOTES
Tracy Brooks 1935     Office/Outpatient Visit    Visit Date: Thu, Feb 8, 2018 11:36 am    Provider: Cathy Lebron MD (Assistant: Radha Simmons MA)    Location: [facility]        Electronically signed by Cathy Lebron MD on  02/14/2018 11:59:18 AM                             SUBJECTIVE:        CC:     Ms. Brooks is a 82 year old White female.  PT SAYS SHE HASNT TAKEN IRON TAB FOR ABOUT A WEEK;         HPI:         Patient presents with acquired hypothyroidism.  This was first diagnosed more than 6 months ago.  She is currently taking Levothyroid, 50 mcg daily.  TSH was last checked two months ago.  The result was reported as normal.  She denies any related symptoms.  She reports no symptoms suggestive of adverse medication effect.          Additionally, she presents with history of hypertension, controlled.  current nonpharmacologic treatment includes low sodium diet.  Her current cardiac medication regimen includes a diuretic, a calcium channel blocker, and an angiotensin receptor blocker.  She has not kept a blood pressure diary, but states that pressures have been well controlled.  She is tolerating the medication well without side effects.  Compliance with treatment has been good; she takes her medication as directed.          Dx with essential hypercholesterolemia; current treatment includes Lipitor and a low cholesterol/low fat diet.  Compliance with treatment has been good; she takes her medication as directed.  She denies experiencing any hypercholesterolemia related symptoms.  Most recent lab tests include Hemoglobin:  12.40 (gm/dl) (07/19/2017), Hematocrit:  36.3 (%) (07/19/2017), Total Cholesterol:  167 (mg/dL) (07/19/2017), HDL:  77 (mg/dL) (07/19/2017), Triglycerides:  74 (mg/dL) (07/19/2017), LDL:  75 (mg/dL) (07/19/2017), TSH:  1.530 (mIU/L) (07/19/2017), Creatinine, Serum:  0.94 (mg/dl) (07/19/2017), Glom Filt Rate, Est:  57 (ml/min/1.73m2) (07/19/2017), Alkaline Phosphatase,  Serum:  82 (U/L) (2017), ALT (SGPT):  22 (U/L) (2017), AST (SGOT):  17 (U/L) (2017), Iron, Serum:  100 (ug/dL) (2017).      chronic knee and hip pain     Tracy has chronic macular degeneration and glaucoma in her R eye and she sees Dr Rosales     iron def anemia, she is off iron supplementation     h/o breast cancer, seeing Dr Jackson, lab markers and scan performed this week, she is on chronic anastrazole     ROS:     CONSTITUTIONAL:  Negative for chills and fever.      CARDIOVASCULAR:  Negative for chest pain and palpitations.      RESPIRATORY:  Negative for recent cough and dyspnea.      GASTROINTESTINAL:  Negative for abdominal pain, nausea and vomiting.      INTEGUMENTARY/BREAST:  Negative for rash.      NEUROLOGICAL:  Negative for dizziness, headaches and weakness.      PSYCHIATRIC:  Negative for anxiety and depression.          PMH/FMH/SH:     Last Reviewed on 2018 12:11 PM by Cathy Lebron    Past Medical History: ENT-DR BARROSO, OPTHOMOLOGIST-DR AKHTAR, CARDIOLOGY-DR FREEMAN    ONCOLOGY DR JACKSON, SURGERY DR ANDERSON, GI DR GARCIA macular degeneration                 PAST MEDICAL HISTORY         Hypertension     Gastroesophageal Reflux Disease     Osteoporosis: takes fortaeo;     has three thyroid nodules     Breast cancer: dx'd in Right with mastectomy , Left with mastectomy 2013;     eyes-macular degeneration eyes b, glaucoma L eye     Hip pain     Scalp laceration     Emphysema, other     Use of high risk medications     Unspecified fall     Dizziness     Arm pain     Osteoporosis     Acquired hypothyroidism     Generalized osteoarthritis     History of breast cancer     Osteopenia     Family history of colon cancer      Essential hypercholesterolemia     chronic back pain    GERD     Thyroid nodule     Knee pain     Shoulder pain     Multiple thoracic compression fractures         GYNECOLOGICAL HISTORY:        Menopause at age 42.              ADVANCE DIRECTIVES:  Living will on file, signed 16, ./pr         PREVENTIVE HEALTH MAINTENANCE             BONE DENSITY: was last done 2015 with the following abnormality noted-- osteoporosis     PAP SMEAR: was last done  with normal results         Surgical History:         Colon Resection: ; diveerticulitis;     bilateral mastectomy , ;      Coronary Artery Stent Placement: 2007; CARDIOLYTE STRESS TEST-NORMAL IN          Family History:     Father:  at age 82; Cause of death was cva     Mother:  at age 81; Cause of death was colon cancer     Brother(s): 1 brother(s) total;  Hyperlipidemia     Sister(s): 2 sister(s) total;  Hyperlipidemia     Positive for Ovarian Cancer ( mat. GM ).          Social History:         Household:  Lives with her lives alone.  Occupation: Retired (Prior occupation: )     Marital Status:      Children: 4 children         Tobacco/Alcohol/Supplements:     Last Reviewed on 2018 12:11 PM by Cathy Lebron    Tobacco: She has never smoked.          Alcohol:  Does not drink alcohol and never has.          Substance Abuse History:     Last Reviewed on 2016 10:43 AM by Kaila Delacruz        Mental Health History:     Last Reviewed on 2016 10:43 AM by Kaila Delacruz        Communicable Diseases (eg STDs):     Last Reviewed on 2016 10:43 AM by Kaila Delacruz            Allergies:     Last Reviewed on 2018 11:37 AM by Radha Simmons    Clindamycin HCl:    Penicillins:    Sulfas:    Codeine:    Cleocin:    Zithromax Z-Mayank:    Bactrim:        Current Medications:     Last Reviewed on 2018 11:37 AM by Radha Simmons    Levothyroxine Sodium 50mcg Capsules 1 p.o. daily     Amlodipine  5mg Tablet 1 tab daily     Atorvastatin Calcium 20mg Tablet 1 tab daily     Losartan/Hydrochlorothiazide 50mg/12.5mg Tablet 1 tab daily     Ferrous Sulfate 325mg Tablets 1 TAB DAILY     Aspirin 81mg Chewable Tablet one a day      Multivitamin/Mineral Supplement Tablet one a day     Acetazolamide 250mg Tablet 1/2 tab bid     Anastrozole 1mg Tablet Take 1 tablet(s) by mouth daily     Senna     Timolol Maleate     OTC Fish Oil with Omega 3 1000mg/300mg take 2 daily     Calcium Citracel with Vitamin D3 500IU and Calcium 630mg daily     Vitamin D3 2,000IU Capsules 1 capsule daily         OBJECTIVE:        Vitals:         Current: 2/8/2018 11:47:50 AM    Ht:  5 ft, 2.5 in;  Wt: 134.7 lbs;  BMI: 24.2    T: 98.3 F (oral);  BP: 122/72 mm Hg (left leg, standing);  P: 74 bpm (finger clip, standing);  sCr: 0.94 mg/dL;  GFR: 44.98    O2 Sat: 98 % (room air)        Exams:     PHYSICAL EXAM:     GENERAL: vital signs recorded - well developed, well nourished;  no apparent distress;     EYES: extraocular movements intact; conjunctiva and cornea are normal; PERRLA;     E/N/T: OROPHARYNX:  normal mucosa, dentition, gingiva, and posterior pharynx;     NECK: range of motion is normal; thyroid is non-palpable;     RESPIRATORY: normal respiratory rate and pattern with no distress; normal breath sounds with no rales, rhonchi, wheezes or rubs;     CARDIOVASCULAR: normal rate; rhythm is regular;  no systolic murmur; 1+ pedal edema;     GASTROINTESTINAL: nontender, nondistended; no hepatosplenomegaly or masses; no bruits;     MUSCULOSKELETAL: gait: poor balance-needs to use her walker;     NEUROLOGIC: mental status: oriented to person, place, and time;  Reflexes: knee jerks: 2+;         ASSESSMENT           244.8   E03.8  Acquired hypothyroidism              DDx:     401.1   I10  Hypertension, controlled              DDx:     272.0   E78.00  Essential hypercholesterolemia              DDx:     715.09   M15.0  Generalized osteoarthritis              DDx:     275.42   E83.52  Hypercalcemia              DDx:     492.8   J43.9  Emphysema, other              DDx:     362.50   H35.30  Macular degeneration              DDx:     280.9   D50.9  Iron deficiency anemia               DDx:     V10.3   Z85.3  History of breast cancer              DDx:         ORDERS:         Meds Prescribed:       Refill of: Levothyroxine Sodium 50mcg Capsules 1 p.o. daily  #90 (Ninety) capsule(s) Refills: 1       Refill of: Amlodipine  5mg Tablet 1 tab daily  #90 (Ninety) tablet(s) Refills: 1       Refill of: Atorvastatin Calcium 20mg Tablet 1 tab daily  #90 (Ninety) tablet(s) Refills: 1       Refill of: Losartan/Hydrochlorothiazide 50mg/12.5mg Tablet 1 tab daily  #90 (Ninety) tablet(s) Refills: 1         Lab Orders:       [id number]  [facility] PHYSICAL: CMP, CBC, TSH, LIPID: [id number], [id number], [id number], [id number]  (Send-Out)                   PLAN:          Acquired hypothyroidism stable on  meds, meds refilled, due for labs     LABORATORY:  Labs ordered to be performed today include PHYSICAL PANEL; CMP, CBC, TSH, LIPID.            Prescriptions:       Refill of: Levothyroxine Sodium 50mcg Capsules 1 p.o. daily  #90 (Ninety) capsule(s) Refills: 1       Refill of: Amlodipine  5mg Tablet 1 tab daily  #90 (Ninety) tablet(s) Refills: 1       Refill of: Atorvastatin Calcium 20mg Tablet 1 tab daily  #90 (Ninety) tablet(s) Refills: 1       Refill of: Losartan/Hydrochlorothiazide 50mg/12.5mg Tablet 1 tab daily  #90 (Ninety) tablet(s) Refills: 1           Orders:       [id number]  [facility] PHYSICAL: CMP, CBC, TSH, LIPID: [id number], [id number], [id number], [id number]  (Send-Out)            Hypertension, controlled well controlled          Essential hypercholesterolemia stable on lipitor          Generalized osteoarthritis defers  topical nsaid at this  time          Hypercalcemia will recheck labs          Emphysema, other doing well          Macular degeneration stable, f/u optometry          Iron deficiency anemia will recheck labs, she is off iron          History of breast cancer h/o breast cancer, seeing Dr Jackson, lab markers and scan performed this week, she is on chronic anastrazole             Other Orders:       [id number]  Office/outpatient visit;  established patient, level 4  (In-House)           CHARGE CAPTURE           **Please note: ICD descriptions below are intended for billing purposes only and may not represent clinical diagnoses**        Primary Diagnosis:         244.8 Acquired hypothyroidism            E03.8    Other specified hypothyroidism    401.1 Hypertension, controlled            I10    Essential (primary) hypertension    272.0 Essential hypercholesterolemia            E78.00    Pure hypercholesterolemia, unspecified    715.09 Generalized osteoarthritis            M15.0    Primary generalized (osteo)arthritis    275.42 Hypercalcemia            E83.52    Hypercalcemia    492.8 Emphysema, other            J43.9    Emphysema, unspecified    362.50 Macular degeneration            H35.30    Unspecified macular degeneration    280.9 Iron deficiency anemia            D50.9    Iron deficiency anemia, unspecified    V10.3 History of breast cancer            Z85.3    Personal history of malignant neoplasm of breast        Other Orders:           [id number]   Office/outpatient visit; established patient, level 4  (In-House)

## 2021-05-18 NOTE — PROGRESS NOTES
Tracy Brooks  1935     Office/Outpatient Visit    Visit Date: Thu, Jan 28, 2021 01:57 pm    Provider: Cathy Lebron MD (Assistant: Dejah Quesada MA)    Location: [facility]        Electronically signed by Cathy Lebron MD on  01/28/2021 05:16:02 PM                             Subjective:        CC: fall    HPI:       Tracy was standing up and her cape fell off so she jerked backwards this afternoon and she went down hard on her buttocks and hit her head, both on wooden floor.  She had loose shoes on.  In addition she is not using her cane properly. Her family is very worried about her.  This is not her first fall.  Tracy has done PT in past for poor balance    ROS:     CONSTITUTIONAL:  Positive for fatigue.   Negative for chills or fever.      EYES:  Negative for blurred vision and eye pain.      E/N/T:  Negative for ear pain and nasal congestion.      CARDIOVASCULAR:  Negative for chest pain and palpitations.      RESPIRATORY:  Negative for recent cough and dyspnea.      GASTROINTESTINAL:  Negative for abdominal pain, nausea and vomiting.      INTEGUMENTARY/BREAST:  Negative for rash.      NEUROLOGICAL:  Positive for ONGOING SHORT TERM MEMORY ISSUES.   Negative for dizziness, headaches or weakness.      PSYCHIATRIC:  Negative for anxiety and depression.          Past Medical History / Family History / Social History:         Last Reviewed on 1/28/2021 02:44 PM by Cathy Lebron    Past Medical History: ENT-DR BARROSO, OPTHOMOLOGIST-DR AKHTAR, CARDIOLOGY-DR FREEMAN    ONCOLOGY DR SARABIA, SURGERY DR ANDERSON, GI DR GARCIA macular degeneration                 PAST MEDICAL HISTORY         Hypertension     Gastroesophageal Reflux Disease     Osteoporosis: takes fortaeo;     has three thyroid nodules     Breast cancer: dx'd in Right with mastectomy 2012, Left with mastectomy 2/2013;     eyes-macular degeneration eyes b, glaucoma L eye     Hip pain     Scalp laceration     Emphysema,  other     Use of high risk medications     Unspecified fall     Dizziness     Arm pain     Osteoporosis     Acquired hypothyroidism     Generalized osteoarthritis     History of breast cancer     Osteopenia     Family history of colon cancer      Essential hypercholesterolemia     chronic back pain    GERD     Thyroid nodule     Knee pain     Shoulder pain     Multiple thoracic compression fractures         GYNECOLOGICAL HISTORY:        Menopause at age 42.              ADVANCE DIRECTIVES: Living will on file, signed 16, ./pr         PREVENTIVE HEALTH MAINTENANCE             BONE DENSITY: was last done 18 with the following abnormality noted-- osteoporosis - declines Prolia     COLORECTAL CANCER SCREENING: Up to date (colonoscopy q10y; sigmoidoscopy q5y; Cologuard q3y) was last done 3/20/18, Results are in chart; colonoscopy with the following abnormalities noted-- diverticulosis; 3/20/18     EYE EXAM: was last done 20 macular degeneration and glaucoma     MAMMOGRAM: was last done  with normal results s/p double mastectomy     PAP SMEAR: was last done  with normal results     Prolia Injection : 1st injection 19, last injection 10/16/20         PAST MEDICAL HISTORY             CURRENT MEDICAL PROVIDERS:    General Surgeon: JOSE    Ophthalmologist: LISETTE    GLUCOMA SPECIALIST- [facility]         Surgical History:         Colon Resection: ; diveerticulitis;     bilateral mastectomy , ;     Coronary Artery Stent Placement: 2007; CARDIOLYTE STRESS TEST-NORMAL IN          Family History:     Father:  at age 82; Cause of death was cva     Mother:  at age 81; Cause of death was colon cancer     Brother(s): 1 brother(s) total;  Hyperlipidemia     Sister(s): 2 sister(s) total;  Hyperlipidemia     Positive for Ovarian Cancer ( mat. GM ).          Social History:         Household:  Lives with her lives alone.  Occupation: Retired (Prior occupation: )      Marital Status:      Children: 4 children         Tobacco/Alcohol/Supplements:     Last Reviewed on 1/28/2021 02:01 PM by Dejah Quesada    Tobacco: She has never smoked.          Alcohol:  Does not drink alcohol and never has.          Substance Abuse History:     Last Reviewed on 12/20/2018 06:54 PM by Ana Sage        Mental Health History:     Last Reviewed on 12/20/2018 06:54 PM by Ana Sage        Communicable Diseases (eg STDs):     Last Reviewed on 12/20/2018 06:54 PM by Ana Sage        Allergies:     Last Reviewed on 8/25/2020 11:17 AM by Ofe Koehler    Clindamycin HCl:      Penicillins:      Sulfas:      Codeine:      Cleocin:      Zithromax Z-Mayank:      Bactrim:          Current Medications:     Last Reviewed on 8/25/2020 11:19 AM by Ofe Koehler    Systane Ultra (PF) 0.4-0.3 % ophthalmic (eye) Dropperette [1 DROP BILATERAL, DAILY]    Multivitamin/Mineral Supplement  Tablet [one a day]    aspirin 81 mg oral tablet,chewable [one a day]    levothyroxine 50 mcg oral tablet [TAKE 1 TABLET DAILY]    Prolia 60 mg/mL subcutaneous Syringe [every 6 months]    ferrous sulfate 325mg Tablets [1 TAB twice weekly]    Vitamin D3 2,000 unit oral capsule [1 capsule daily]    OTC Fish Oil with Omega 3 1000mg/300mg take 2 daily     amLODIPine 5 mg oral tablet [TAKE 1 TABLET DAILY]    Timolol Maleate     losartan 50 mg oral tablet [TAKE 1 TABLET DAILY]    Stiolto Respimat 2.5-2.5 mcg/actuation Inhalation Mist [inhale 2 puffs by inhalation route once daily at the same time each day]    My MDI Portable Nebuliser device  [attach to inhaler to use]        Objective:        Vitals:         Current: 1/28/2021 2:03:40 PM    Ht:  5 ft, 2.5 in;  Wt: 132.2 lbs;  BMI: 23.8T: 97.3 F (temporal);  BP: 146/85 mm Hg (right arm, sitting);  P: 86 bpm (right arm (BP Cuff), sitting);  sCr: 1 mg/dL;  GFR: 39.93        Exams:     PHYSICAL EXAM:     GENERAL: vital signs recorded - well developed, well  nourished;  no apparent distress;     EYES: PERRL, EOMI     E/N/T: OROPHARYNX:  normal mucosa, dentition, gingiva, and posterior pharynx;     NECK: supp;e;     RESPIRATORY: CTA B WITHOUT WHEEZING/RALES OR RHONCHI, NORMAL RESP. EFFORT     CARDIOVASCULAR: normal rate; rhythm is regular;  no systolic murmur; no edema;     MUSCULOSKELETAL: gait: slowed and unsteady;  tender to palpation over coccyx; pelvis intact, hips intact and FROM, neg straight leg raise,, NT over C/T/L spine, FROM of neck, FROM shoulder/elbows/wrist/hand without edema or deformity    Skin-pump knot on scalp resolved, speech is normal.    NEUROLOGIC: mental status: oriented to person, place, and time;  GROSSLY INTACT     PSYCHIATRIC: appropriate affect and demeanor; normal thought and perception;         Assessment:         W19.XXXA   Unspecified fall, initial encounter       M53.3   Sacrococcygeal disorders, not elsewhere classified       R27.8   Other lack of coordination       R53.1   Weakness           ORDERS:         Radiology/Test Orders:       [id number]  Radiologic examination, spine, lumbosacral;  minimum of four views  (Send-Out)            [id number]  Radiologic exam/sacrum & coccyx, 2 view  (Send-Out)                      Plan:         Unspecified fall, initial encounterwill order PT, she needs to wear good shoes and use her walker and cane properly        Sacrococcygeal disorders, not elsewhere classified        RADIOLOGY:  I have ordered Lumbar/Sacral Spine X-ray to be done today.            Orders:       [id number]  Radiologic examination, spine, lumbosacral;  minimum of four views  (Send-Out)            [id number]  Radiologic exam/sacrum & coccyx, 2 view  (Send-Out)              Other lack of coordinationwill order her PT thru home health for strengthening and balance and OT to help show her how to use a cane and walker        Weaknessreferal for PT            Charge Capture:         Primary Diagnosis:     W19.XXXA  Unspecified fall, initial encounter            Orders:      [id number]  Office/outpatient visit; established patient, level 4  (In-House)              M53.3  Sacrococcygeal disorders, not elsewhere classified     R27.8  Other lack of coordination     R53.1  Weakness         ADDENDUMS:      ____________________________________    Addendum: 01/28/2021 02:55 PM - One, Team         Visit Note Faxed to:        [facility]; Number [phone]

## 2021-05-18 NOTE — PROGRESS NOTES
Tracy Brooks  1935     Office/Outpatient Visit    Visit Date: Wed, Jan 29, 2020 02:08 pm    Provider: Cathy Lebron MD (Assistant: Maria Del Rosario Baptiste MA)    Location: [facility]        Electronically signed by Cathy Lebron MD on  02/03/2020 09:33:30 AM                             Subjective:        CC: Ms. Brooks is a 84 year old White female.  MEDICARE WELLNESS;         HPI:           Ms. Brooks is here for a Medicare wellness visit.  The required HRA questions are integrated within this visit note. Family medical history and individual medical/surgical history were reviewed and updated.  A current height, weight, BMI, blood pressure, and pulse were recorded in the vitals section of the note and have been reviewed. Patient's medications, including supplements, were recorded in the chart and reviewed.  Current providers and suppliers were reviewed and updated.          Self-Assessment of Health: She rates her health as good. She rates her confidence of being able to control/manage most of her health problems as very confident. Her physical/emotional health has limited her social activites moderately.  A review of possible cognitive impairment was performed and the following was noted: she is not having trouble driving;  memory changes are noted;  she is not having trouble with her finances A review of functional ability, including bathing, dressing, walking, and urine/bowel continence as well as level of safety was performed and was found to be negative.  Falls Risk: Has not had any falls or only one fall without injury in the past year.  In regard to hearing, she reports having trouble hearing the TV/radio when others do not and having to strain to hear or understand conversations, but not wearing hearing aid(s).  Concerning home safety, she reports that at home she DOES have adequate lighting, a skid resistant shower/tub, grab bars in the bath, handrails on stairs, functioning  smoke alarms and absence of throw rugs.  Physical Activity: She never excercises.; Type of diet patient normally eats is described as poor--needs improvement.  Tobacco: She has never smoked.  Preventative Health updated today           PHQ-9 Depression Screening: Completed form scanned and in chart; Total Score 4           In regard to the essential (primary) hypertension, her current cardiac medication regimen includes a calcium channel blocker ( Norvasc ) and a combination medication ( Hyzaar ).  She is tolerating the medication well without side effects.            Dx with other specified hypothyroidism; she is currently taking Synthroid, 50 mcg daily.  TSH was last checked 6 months ago.  The result was reported as normal.            Concerning pure hypercholesterolemia, unspecified, current treatment includes Lipitor and a low cholesterol/low fat diet.  Compliance with treatment has been good; she takes her medication as directed.  She denies experiencing any hypercholesterolemia related symptoms.  Most recent lab tests include Creatinine, Serum:  0.86 (mg/dl) (06/25/2018), Glom Filt Rate, Est:  >60 (ml/min/1.73m2) (06/25/2018), Hemoglobin:  12.00 (gm/dl) (06/25/2018), Hematocrit:  35.5 (%) (06/25/2018), Total Cholesterol:  147 (mg/dL) (06/25/2018), HDL:  69 (mg/dL) (06/25/2018), Triglycerides:  58 (mg/dL) (06/25/2018), LDL:  66 (mg/dL) (06/25/2018), TSH:  1.850 (mIU/L) (06/25/2018), Calcium, Total:  10.5 (mg/dl) (06/25/2018), Alkaline Phosphatase, Serum:  88 (U/L) (06/25/2018), ALT (SGPT):  22 (U/L) (06/25/2018), AST (SGOT):  20 (U/L) (06/25/2018).      ROS:     CONSTITUTIONAL:  Positive for fatigue.   Negative for chills or fever.      EYES:  Negative for blurred vision and eye pain.      E/N/T:  Negative for ear pain and nasal congestion.      CARDIOVASCULAR:  Negative for chest pain and palpitations.      RESPIRATORY:  Negative for recent cough and dyspnea.      GASTROINTESTINAL:  Negative for abdominal  pain, nausea and vomiting.      INTEGUMENTARY/BREAST:  Negative for rash.      NEUROLOGICAL:  Positive for ONGOING SHORT TERM MEMORY ISSUES.   Negative for dizziness, headaches or weakness.      PSYCHIATRIC:  Negative for anxiety and depression.          Past Medical History / Family History / Social History:         Last Reviewed on 2020 02:48 PM by Cathy Lebron    Past Medical History: ENT-DR BARROSO, OPTHOMOLOGIST-DR AKHTAR, CARDIOLOGY-DR FREEMAN    ONCOLOGY DR SARABIA, SURGERY DR ANDERSON, GI DR GARCIA macular degeneration                 PAST MEDICAL HISTORY         Hypertension     Gastroesophageal Reflux Disease     Osteoporosis: takes fortaeo;     has three thyroid nodules     Breast cancer: dx'd in Right with mastectomy , Left with mastectomy 2013;     eyes-macular degeneration eyes b, glaucoma L eye     Hip pain     Scalp laceration     Emphysema, other     Use of high risk medications     Unspecified fall     Dizziness     Arm pain     Osteoporosis     Acquired hypothyroidism     Generalized osteoarthritis     History of breast cancer     Osteopenia     Family history of colon cancer      Essential hypercholesterolemia     chronic back pain    GERD     Thyroid nodule     Knee pain     Shoulder pain     Multiple thoracic compression fractures         GYNECOLOGICAL HISTORY:        Menopause at age 42.              ADVANCE DIRECTIVES: Living will on file, signed 16, ./pr         PREVENTIVE HEALTH MAINTENANCE             BONE DENSITY: was last done 18 with the following abnormality noted-- osteoporosis - declines Prolia     COLORECTAL CANCER SCREENING: Up to date (colonoscopy q10y; sigmoidoscopy q5y; Cologuard q3y) was last done 3/20/18, Results are in chart; colonoscopy with the following abnormalities noted-- diverticulosis; 3/20/18     EYE EXAM: was last done 20 macular degeneration and glaucoma     MAMMOGRAM: was last done  with normal results s/p double mastectomy      PAP SMEAR: was last done  with normal results     Prolia Injection : 1st injection 19, last injection 10/16/19         PAST MEDICAL HISTORY             CURRENT MEDICAL PROVIDERS:    General Surgeon: JOSE    Ophthalmologist: LISETTE MONTERO SPECIALIST- ANTONIO         Surgical History:         Colon Resection: ; diveerticulitis;     bilateral mastectomy , ;     Coronary Artery Stent Placement: 2007; CARDIOLYTE STRESS TEST-NORMAL IN          Family History:     Father:  at age 82; Cause of death was cva     Mother:  at age 81; Cause of death was colon cancer     Brother(s): 1 brother(s) total;  Hyperlipidemia     Sister(s): 2 sister(s) total;  Hyperlipidemia     Positive for Ovarian Cancer ( mat. GM ).          Social History:         Household:  Lives with her lives alone.  Occupation: Retired (Prior occupation: )     Marital Status:      Children: 4 children         Tobacco/Alcohol/Supplements:     Last Reviewed on 2020 02:15 PM by Maria Del Rosario Baptiste    Tobacco: She has never smoked.          Alcohol:  Does not drink alcohol and never has.          Substance Abuse History:     Last Reviewed on 2018 06:54 PM by Ana Sage        Mental Health History:     Last Reviewed on 2018 06:54 PM by Ana Sage        Communicable Diseases (eg STDs):     Last Reviewed on 2018 06:54 PM by Ana Sage        Immunizations:     zzPrevnar-13 2016    PNEUMOVAX 23 (Pneumococcal PPV23) 2018        Allergies:     Last Reviewed on 2019 03:19 PM by Spurling, Sarah C    Clindamycin HCl:      Penicillins:      Sulfas:      Codeine:      Cleocin:      Zithromax Z-Mayank:      Bactrim:          Current Medications:     Last Reviewed on 2020 02:18 PM by Maria Del Rosario Baptiste    Multivitamin/Mineral Supplement  Tablet [one a day]    aspirin 81 mg oral tablet,chewable [one a day]    levothyroxine 50 mcg oral tablet [TAKE 1  TABLET DAILY]    Prolia 60 mg/mL subcutaneous Syringe [every 6 months]    ferrous sulfate 325mg Tablets [1 TAB twice weekly]    Vitamin D3 2,000 unit oral capsule [1 capsule daily]    OTC Fish Oil with Omega 3 1000mg/300mg take 2 daily     amLODIPine 5 mg oral tablet [TAKE 1 TABLET DAILY]    atorvastatin 20 mg oral tablet [TAKE 1 TABLET DAILY]    Timolol Maleate     losartan 50 mg oral tablet [TAKE 1 TABLET DAILY]    Systane Ultra (PF) 0.4-0.3 % ophthalmic (eye) Dropperette [1 DROP BILATERAL, DAILY]        Objective:        Vitals:         Current: 1/29/2020 2:22:12 PM    Ht:  5 ft, 2.5 in;  Wt: 130.8 lbs;  BMI: 23.5T: 97.9 F (oral);  BP: 120/77 mm Hg (right arm, sitting);  P: 85 bpm (right arm (BP Cuff), sitting);  sCr: 0.81 mg/dL;  GFR: 49.90VA: 20/200 OD, 20/100 OS (near, without correction)        Exams:     PHYSICAL EXAM:     GENERAL: vital signs recorded - well developed, well nourished;  no apparent distress;     EYES: PERRL, EOMI     E/N/T: EARS:  normal external auditory canals and tympanic membranes;  grossly normal hearing; OROPHARYNX:  normal mucosa, dentition, gingiva, and posterior pharynx;     NECK: range of motion is normal; thyroid is non-palpable;     RESPIRATORY: normal respiratory rate and pattern with no distress; normal breath sounds with no rales, rhonchi, wheezes or rubs;     CARDIOVASCULAR: normal rate; rhythm is regular;  no systolic murmur; no edema;     GASTROINTESTINAL: nontender, nondistended; no hepatosplenomegaly or masses; no bruits;     BREAST/INTEGUMENT: breast-deferred, s/p double mastectomy;     MUSCULOSKELETAL: gait: slowed and unsteady;     NEUROLOGIC: mental status: oriented to person, place, and time;  Reflexes: knee jerks: 2+;  GROSSLY INTACT     PSYCHIATRIC: appropriate affect and demeanor; normal thought and perception;         Lab/Test Results:         Iron, Serum: 52 (ug/dL) (12/20/2019),     HDL: 61 (mg/dL) (12/20/2019),     LDL: 75 (mg/dL) (12/20/2019),     Total  Cholesterol: 145 (mg/dL) (12/20/2019),     Triglycerides: 44 (mg/dL) (12/20/2019),     TSH: 2.900 (mIU/L) (12/20/2019),     WBC count: 4.45 (K/ul) (06/10/2019),     Hemoglobin: 13.30 (gm/dl) (06/10/2019),     Hematocrit: 38.4 (%) (06/10/2019),     Creatinine, Serum: 0.81 (mg/dl) (12/20/2019),     Glom Filt Rate, Est: >60 (ml/min/1.73m2) (12/20/2019),     Alkaline Phosphatase, Serum: 75 (U/L) (12/20/2019),     ALT (SGPT): 27 (U/L) (12/20/2019),     AST (SGOT): 21 (U/L) (12/20/2019),             Assessment:         Z00.00   Encounter for general adult medical examination without abnormal findings       Z13.31   Encounter for screening for depression       I10   Essential (primary) hypertension       M15.0   Primary generalized (osteo)arthritis       E03.8   Other specified hypothyroidism       J43.9   Emphysema, unspecified       E83.52   Hypercalcemia       E78.00   Pure hypercholesterolemia, unspecified       M81.0   Age-related osteoporosis without current pathological fracture       D50.9   Iron deficiency anemia, unspecified       H35.3133   Nonexudative age-related macular degeneration, bilateral, advanced atrophic without subfoveal involvement       H42   Glaucoma in diseases classified elsewhere       R27.8   Other lack of coordination       H81.03   Meniere's disease, bilateral       R35.0   Frequency of micturition           ORDERS:         Radiology/Test Orders:       [id number]  Colorectal CA screen results documented and reviewed (PV)  (In-House)              Lab Orders:       [id number]  [facility] Urinalysis, automated, with micro  (Send-Out)              Procedures Ordered:       REFER  Referral to Specialist or Other Facility  (Send-Out)              Annual wellness visit, includes a PPPS, subsequent visit  (In-House)              Other Orders:         Depression screen negative  (In-House)            1101F  Pt screen for fall risk; document no falls in past year or only 1 fall w/o injury in past  year (TIMMY)  (In-House)              Screening mammogram results documented  (Send-Out)                      Plan:         Encounter for general adult medical examination without abnormal findings MEDICARE WELLNESS EXAM-SHE IS DONE WITH MAMMOGRAM (double mastectomy), DONE WITH PAP/PELVIC, UTD ON  DEXA-NEEDS PROLIA AND SHE DEFERS, UTD ON COLONOSCOPY DONE 2018 AND IS DONE, DEFERS FLU/SHINGLES/TD AND UTD PNEUMOVAX/PREVNAR , LONG STANDING FALL RISK-SHE USES A WALKER AND A CANE, MILD MEMORY ISSUES IS STABLE, NO DEPRESSION, SHE LIVES ALONE BUT HAS GREAT FAMILY SUPPORT-CAMERA SYSTEM IN HOME FOR CLOSE MONITORING, SHE IS DRIVING, ABLE TO  PERFORM ADL'S AND FINANCES, SHE NOW HAS A  LIVING WILL, [employer] AND  PREV SERVICES HANDOUT WAS GIVEN TO HER. MD LIST UPDATED         ADVANCE DIRECTIVES (Please update in [facility]): Living will REQUESTED and Durable Power of  REQUESTED           Orders:         Annual wellness visit, includes a PPPS, subsequent visit  (In-House)              Encounter for screening for depression    MIPS PHQ-9 Depression Screening: Completed form scanned and in chart; Total Score 4; Negative Depression Screen           Orders:         Depression screen negative  (In-House)            1101F  Pt screen for fall risk; document no falls in past year or only 1 fall w/o injury in past year (TIMMY)  (In-House)              Screening mammogram results documented  (Send-Out)            3017F  Colorectal CA screen results documented and reviewed (PV)  (In-House)              Essential (primary) hypertensionWELL CONTROLLED        Primary generalized (osteo)arthritisWITH POOR BALANCE-I recommend PT        REFERRALS:  Referral initiated to physical therapy ( [facility] ) for evaluation and treatment.            Orders:       REFER  Referral to Specialist or Other Facility  (Send-Out)              Other specified hypothyroidismwell controlled        Emphysema,  unspecifiedstable, not on any inhalers-they made her feel worse        Hypercalcemiastable        Pure hypercholesterolemia, unspecifiedwell controlled on atorvastatin-I would stop due to age, she is aware of  risks, yamile with her cardiac stent, but her chol is very low        Age-related osteoporosis without current pathological fracturedefers prolia        Iron deficiency anemia, unspecifiedon iron        Nonexudative age-related macular degeneration, bilateral, advanced atrophic without subfoveal involvementcont eye exams, and eye drops for dry eyes, she is getting retinal injections at this time        Glaucoma in diseases classified elsewherecont eye exams and timolol eye drops        Other lack of coordinationreferal in place for PT        Meniere's disease, bilateralstable, intermittent, she is off meclizine, her pharmacist told her she could not be on meclizine with her glaucoma-I advise she d/w her eye doctor        Frequency of micturition    LABORATORY:  Labs ordered to be performed today include urinalysis with micro.            Orders:       [id number]  [facility] Urinalysis, automated, with micro  (Send-Out)                  Patient Recommendations:        For  Encounter for general adult medical examination without abnormal findings:    I also recommend REQUESTED. I also recommend REQUESTED.              Charge Capture:         Primary Diagnosis:     Z00.00  Encounter for general adult medical examination without abnormal findings           Orders:      [id number]  Preventive medicine, established patient, age 65+ years  (In-House)              Annual wellness visit, includes a PPPS, subsequent visit  (In-House)              Z13.31  Encounter for screening for depression           Orders:      [id number]  Office/outpatient visit; established patient, level 4  (In-House)              Depression screen negative  (In-House)            1101F  Pt screen for fall risk; document no falls in past year or only 1  fall w/o injury in past year (TIMMY)  (In-House)            3017F  Colorectal CA screen results documented and reviewed (PV)  (In-House)              I10  Essential (primary) hypertension     M15.0  Primary generalized (osteo)arthritis     E03.8  Other specified hypothyroidism     J43.9  Emphysema, unspecified     E83.52  Hypercalcemia     E78.00  Pure hypercholesterolemia, unspecified     M81.0  Age-related osteoporosis without current pathological fracture     D50.9  Iron deficiency anemia, unspecified     H35.3133  Nonexudative age-related macular degeneration, bilateral, advanced atrophic without subfoveal involvement     H42  Glaucoma in diseases classified elsewhere     R27.8  Other lack of coordination     H81.03  Meniere's disease, bilateral     R35.0  Frequency of micturition

## 2021-05-18 NOTE — PROGRESS NOTES
Tracy Brooks 1935     Office/Outpatient Visit    Visit Date: Mon, Meek 10, 2019 10:47 am    Provider: Cathy Lebron MD (Assistant: Maria Del Rosario Baptiste MA)    Location: [facility]        Electronically signed by Cathy Lebron MD on  06/12/2019 09:43:41 AM                             SUBJECTIVE:        CC: (PT HAS NOT BEEN TAKING IRON)     Ms. Brooks is a 83 year old White female.  This is a follow-up visit.          HPI:         Patient presents with acquired hypothyroidism.  This was first diagnosed more than 6 months ago.  She is currently taking Levothyroid, 50 mcg daily.  TSH was last checked two months ago.  The result was reported as normal.  She denies any related symptoms.  She reports no symptoms suggestive of adverse medication effect.          With regard to the hypertension, controlled, current nonpharmacologic treatment includes low sodium diet.  Her current cardiac medication regimen includes a diuretic, a calcium channel blocker, and an angiotensin receptor blocker.  She has not kept a blood pressure diary, but states that pressures have been well controlled.  She is tolerating the medication well without side effects.  Compliance with treatment has been good; she takes her medication as directed.          Additionally, she presents with history of essential hypercholesterolemia.  current treatment includes Lipitor and a low cholesterol/low fat diet.  Compliance with treatment has been good; she takes her medication as directed.  She denies experiencing any hypercholesterolemia related symptoms.  Most recent lab tests include Creatinine, Serum:  0.86 (mg/dl) (06/25/2018), Glom Filt Rate, Est:  >60 (ml/min/1.73m2) (06/25/2018), Hemoglobin:  12.00 (gm/dl) (06/25/2018), Hematocrit:  35.5 (%) (06/25/2018), Total Cholesterol:  147 (mg/dL) (06/25/2018), HDL:  69 (mg/dL) (06/25/2018), Triglycerides:  58 (mg/dL) (06/25/2018), LDL:  66 (mg/dL) (06/25/2018), TSH:  1.850 (mIU/L)  (2018), Calcium, Total:  10.5 (mg/dl) (2018), Alkaline Phosphatase, Serum:  88 (U/L) (2018), ALT (SGPT):  22 (U/L) (2018), AST (SGOT):  20 (U/L) (2018).      iron def anemia, she takes iron OTC but hasnt done so for the past 4 months, she is feeling more tired.     ROS:     CONSTITUTIONAL:  Positive for fatigue.   Negative for chills or fever.      CARDIOVASCULAR:  Negative for chest pain and palpitations.      RESPIRATORY:  Negative for recent cough and dyspnea.      GASTROINTESTINAL:  Negative for abdominal pain, nausea and vomiting.      INTEGUMENTARY/BREAST:  Negative for rash.      NEUROLOGICAL:  Positive for worsening memory, yamile recalling names, h/o 2 concussions-one , other .   Negative for dizziness, headaches or weakness.      PSYCHIATRIC:  Negative for anxiety and depression.          PMH/FMH/SH:     Last Reviewed on 6/10/2019 11:24 AM by Cathy Lebron    Past Medical History: ENT-DR BARROSO, OPTHOMOLOGIST-DR AKHTAR, CARDIOLOGY-DR FREEMAN    ONCOLOGY DR SARABIA, SURGERY DR ANDERSON, GI DR GARCIA macular degeneration                 PAST MEDICAL HISTORY         Hypertension     Gastroesophageal Reflux Disease     Osteoporosis: takes fortaeo;     has three thyroid nodules     Breast cancer: dx'd in Right with mastectomy , Left with mastectomy 2013;     eyes-macular degeneration eyes b, glaucoma L eye     Hip pain     Scalp laceration     Emphysema, other     Use of high risk medications     Unspecified fall     Dizziness     Arm pain     Osteoporosis     Acquired hypothyroidism     Generalized osteoarthritis     History of breast cancer     Osteopenia     Family history of colon cancer      Essential hypercholesterolemia     chronic back pain    GERD     Thyroid nodule     Knee pain     Shoulder pain     Multiple thoracic compression fractures         GYNECOLOGICAL HISTORY:        Menopause at age 42.              ADVANCE DIRECTIVES: Living will on file,  signed 16, ./pr         PREVENTIVE HEALTH MAINTENANCE             BONE DENSITY: was last done 18 with the following abnormality noted-- osteoporosis - declines Prolia     COLORECTAL CANCER SCREENING: Up to date (colonoscopy q10y; sigmoidoscopy q5y; Cologuard q3y) was last done 3/20/18, Results are in chart; colonoscopy with the following abnormalities noted-- diverticulosis     EYE EXAM: was last done 2018 macular degeneration and glaucoma     MAMMOGRAM: was last done  with normal results s/p double mastectomy     PAP SMEAR: was last done  with normal results     Prolia Injection : 1st injection 19         Surgical History:         Colon Resection: ; diveerticulitis;     bilateral mastectomy , ;      Coronary Artery Stent Placement: 2007; CARDIOLYTE STRESS TEST-NORMAL IN          Family History:     Father:  at age 82; Cause of death was cva     Mother:  at age 81; Cause of death was colon cancer     Brother(s): 1 brother(s) total;  Hyperlipidemia     Sister(s): 2 sister(s) total;  Hyperlipidemia     Positive for Ovarian Cancer ( mat. GM ).          Social History:         Household:  Lives with her lives alone.  Occupation: Retired (Prior occupation: )     Marital Status:      Children: 4 children         Tobacco/Alcohol/Supplements:     Last Reviewed on 6/10/2019 11:24 AM by Cathy Lebron    Tobacco: She has never smoked.          Alcohol:  Does not drink alcohol and never has.          Substance Abuse History:     Last Reviewed on 2018 06:54 PM by Ana Sage        Mental Health History:     Last Reviewed on 2018 06:54 PM by Ana Sage        Communicable Diseases (eg STDs):     Last Reviewed on 2018 06:54 PM by Ana Sage            Allergies:     Last Reviewed on 2018 11:43 AM by Deven Batista    Clindamycin HCl:    Penicillins:    Sulfas:    Codeine:    Cleocin:    Zithromax  Z-Mayank:    Bactrim:        Current Medications:     Last Reviewed on 12/31/2018 11:43 AM by Deven Batista    Losartan/Hydrochlorothiazide 50mg/12.5mg Tablet 1 tab daily     Levothyroxine Sodium 50mcg Capsules 1 p.o. daily     Amlodipine  5mg Tablet 1 tab daily     Atorvastatin Calcium 20mg Tablet 1 tab daily     Ferrous Sulfate 325mg Tablets 1 TAB DAILY     Aspirin 81mg Chewable Tablet one a day     Multivitamin/Mineral Supplement Tablet one a day     Timolol Maleate     OTC Fish Oil with Omega 3 1000mg/300mg take 2 daily     Vitamin D3 2,000IU Capsules 1 capsule daily         OBJECTIVE:        Vitals:         Current: 6/10/2019 10:56:27 AM    Ht:  5 ft, 2.5 in;  Wt: 131.8 lbs;  BMI: 23.7    T: 97.6 F (oral);  BP: 105/62 mm Hg (right arm, sitting);  P: 76 bpm (right radial, sitting);  sCr: 0.84 mg/dL;  GFR: 49.07        Exams:     PHYSICAL EXAM:     GENERAL: vital signs recorded - well developed, well nourished;  no apparent distress;     EYES: PERRL, EOMI     E/N/T: EARS:  normal external auditory canals and tympanic membranes;  grossly normal hearing; OROPHARYNX:  normal mucosa, dentition, gingiva, and posterior pharynx;     NECK: range of motion is normal; thyroid is non-palpable;     RESPIRATORY: normal respiratory rate and pattern with no distress; normal breath sounds with no rales, rhonchi, wheezes or rubs;     CARDIOVASCULAR: normal rate; rhythm is regular;  no systolic murmur; no edema;     GASTROINTESTINAL: nontender, nondistended; no hepatosplenomegaly or masses; no bruits;     MUSCULOSKELETAL: gait: BALANCE is better, off walker;     NEUROLOGIC: mental status: oriented to person, place, and time;  Reflexes: knee jerks: 2+;  GROSSLY INTACT     PSYCHIATRIC: appropriate affect and demeanor; normal thought and perception;         Lab/Test Results:         LABORATORY RESULTS: EKG performed by tls         ASSESSMENT           244.8   E03.8  Acquired hypothyroidism              DDx:     401.1   I10   Hypertension, controlled              DDx:     272.0   E78.00  Essential hypercholesterolemia              DDx:     275.42   E83.52  Hypercalcemia              DDx:     V10.3   Z85.3  History of breast cancer              DDx:     733.01   M81.0  Postmenopausal osteoporosis              DDx:     280.9   D50.9  Iron deficiency anemia              DDx:     729.5   B37.2  Toe pain              DDx:     110.1   B37.2  Toe onychomycosis              DDx:     785.1   R00.2  Heart palpitation              DDx:         ORDERS:         Meds Prescribed:       Refill of: Losartan/Hydrochlorothiazide 50mg/12.5mg Tablet 1 tab daily  #90 (Ninety) tablet(s) Refills: 1       Refill of: Levothyroxine Sodium 50mcg Capsules 1 p.o. daily  #90 (Ninety) capsule(s) Refills: 1       Refill of: Amlodipine  5mg Tablet 1 tab daily  #90 (Ninety) tablet(s) Refills: 1       Refill of: Atorvastatin Calcium 20mg Tablet 1 tab daily  #90 (Ninety) tablet(s) Refills: 1         Radiology/Test Orders:       [id number]  Electrocardiogram, routine with at least 12 leads; with interpretation and report  (In-House)           Lab Orders:       [id number]  BDCB2 - [facility] CBC w/o diff  (Send-Out)         27731  IRON - [facility] Iron, serum  (Send-Out)         45351  TSH - [facility] TSH  (Send-Out)         10593  HTNLP - [facility] CMP AND LIPID: [id number]  (Send-Out)           Procedures Ordered:       REFER  Referral to Specialist or Other Facility  (Send-Out)           Other Orders:         Depression screen negative  (In-House)           Negative EtOH screen  (In-House)           EKG, performed as a screening with Welcome to Medicare (x1)                 PLAN:          Acquired hypothyroidism stable on meds, due for labs     LABORATORY:  Labs ordered to be performed today include TSH.  MIPS Negative Depression Screen Negative alcohol screen           Prescriptions:       Refill of: Losartan/Hydrochlorothiazide 50mg/12.5mg Tablet 1 tab daily  #90 (Ninety) tablet(s)  Refills: 1       Refill of: Levothyroxine Sodium 50mcg Capsules 1 p.o. daily  #90 (Ninety) capsule(s) Refills: 1       Refill of: Amlodipine  5mg Tablet 1 tab daily  #90 (Ninety) tablet(s) Refills: 1       Refill of: Atorvastatin Calcium 20mg Tablet 1 tab daily  #90 (Ninety) tablet(s) Refills: 1           Orders:       [id number]  TSH - [facility] TSH  (Send-Out)           Depression screen negative  (In-House)           Negative EtOH screen  (In-House)            Hypertension, controlled well controlled, due for labs     LABORATORY:  Labs ordered to be performed today include HTN/Lipid Panel: CMP, Lipid.            Orders:       [id number]  HTNLP - [facility] CMP AND LIPID: [id number]  (Send-Out)            Essential hypercholesterolemia stable on meds, due for labs          Hypercalcemia off calcium supplements          History of breast cancer s/p mastectomy, she is doing well          Postmenopausal osteoporosis tolerating prolia well          Iron deficiency anemia off iron, will check labs to see if she needs to continue this medication     LABORATORY:  Labs ordered to be performed today include CBC W/O DIFF and Iron Serum.            Orders:       [id number]  BDCB2 - [facility] CBC w/o diff  (Send-Out)         11032  IRON - [facility] Iron, serum  (Send-Out)            Toe pain hit her L big toe this am getting into the shower          Toe onychomycosis L great toenail-severe-will refer her to podiatry to [facility] for nail removal.         REFERRALS:  Referral initiated to a podiatrist ( at [facility] ).            Orders:       REFER  Referral to Specialist or Other Facility  (Send-Out)            Heart palpitation EKG ok, if this reoccurs I will need to set her up with holter monitor.         TESTS/PROCEDURES:  Will proceed with an ECG to be performed/scheduled now.            Orders:       [id number]  Electrocardiogram, routine with at least 12 leads; with interpretation and report  (In-House)                     EKG, performed as a screening  with Welcome to Medicare (x1)             CHARGE CAPTURE           **Please note: ICD descriptions below are intended for billing purposes only and may not represent clinical diagnoses**        Primary Diagnosis:         244.8 Acquired hypothyroidism            E03.8    Other specified hypothyroidism              Orders:          61032   Office/outpatient visit; established patient, level 4  (In-House)                Depression screen negative  (In-House)                Negative EtOH screen  (In-House)           401.1 Hypertension, controlled            I10    Essential (primary) hypertension    272.0 Essential hypercholesterolemia            E78.00    Pure hypercholesterolemia, unspecified    275.42 Hypercalcemia            E83.52    Hypercalcemia    V10.3 History of breast cancer            Z85.3    Personal history of malignant neoplasm of breast    733.01 Postmenopausal osteoporosis            M81.0    Age-related osteoporosis without current pathological fracture    280.9 Iron deficiency anemia            D50.9    Iron deficiency anemia, unspecified    729.5 Toe pain            B37.2    Candidiasis of skin and nail    110.1 Toe onychomycosis            B37.2    Candidiasis of skin and nail    785.1 Heart palpitation            R00.2    Palpitations              Orders:          [id number]   Electrocardiogram, routine with at least 12 leads; with interpretation and report  (In-House)                                           EKG, performed as a screening with Welcome to Medicare (x1)

## 2021-05-18 NOTE — PROGRESS NOTES
Tracy Brooks SAHIL  1935     Office/Outpatient Visit    Visit Date: Tue, Aug 25, 2020 11:13 am    Provider: Cathy Lebron MD (Assistant: Ofe Koehler MA)    Location: [facility]        Electronically signed by Cathy Lebron MD on  08/25/2020 04:56:56 PM                             Subjective:        CC: PT STATES SHE IS NOT TAKING IRON,     HPI:       She has emphysema and her SOA is unsure if she is any better since starting the stilolto.  She has been on this medicine for 1 1/2 weeks.   She still feels tired and is less active due to SOA with activity.  She tolerates med well      her calcium levels are elevated and she is not on any calcium supplementation      new onset of stage 2 kidney failure      she has foul smelling urine    ROS:     CONSTITUTIONAL:  Positive for fatigue.   Negative for chills or fever.      EYES:  Negative for blurred vision and eye pain.      E/N/T:  Negative for ear pain and nasal congestion.      CARDIOVASCULAR:  Negative for chest pain and palpitations.      RESPIRATORY:  Positive for dyspnea.   Negative for recent cough or frequent wheezing.      GASTROINTESTINAL:  Negative for abdominal pain, nausea and vomiting.      INTEGUMENTARY/BREAST:  Negative for rash.      NEUROLOGICAL:  Positive for short term memory loss.   Negative for dizziness, headaches or weakness.      PSYCHIATRIC:  Negative for anxiety and depression.          Past Medical History / Family History / Social History:         Last Reviewed on 8/25/2020 11:43 AM by Cathy Lebron    Past Medical History: ENT-DR BARROSO, OPTHOMOLOGIST-DR AKHTAR, CARDIOLOGY-DR FREEMAN    ONCOLOGY DR SARABIA, SURGERY DR ANDERSON, GI DR GARCIA macular degeneration                 PAST MEDICAL HISTORY         Hypertension     Gastroesophageal Reflux Disease     Osteoporosis: takes fortaeo;     has three thyroid nodules     Breast cancer: dx'd in Right with mastectomy 2012, Left with mastectomy 2/2013;      eyes-macular degeneration eyes b, glaucoma L eye     Hip pain     Scalp laceration     Emphysema, other     Use of high risk medications     Unspecified fall     Dizziness     Arm pain     Osteoporosis     Acquired hypothyroidism     Generalized osteoarthritis     History of breast cancer     Osteopenia     Family history of colon cancer      Essential hypercholesterolemia     chronic back pain    GERD     Thyroid nodule     Knee pain     Shoulder pain     Multiple thoracic compression fractures         GYNECOLOGICAL HISTORY:        Menopause at age 42.              ADVANCE DIRECTIVES: Living will on file, signed 16, ./pr         PREVENTIVE HEALTH MAINTENANCE             BONE DENSITY: was last done 18 with the following abnormality noted-- osteoporosis - declines Prolia     COLORECTAL CANCER SCREENING: Up to date (colonoscopy q10y; sigmoidoscopy q5y; Cologuard q3y) was last done 3/20/18, Results are in chart; colonoscopy with the following abnormalities noted-- diverticulosis; 3/20/18     EYE EXAM: was last done 20 macular degeneration and glaucoma     MAMMOGRAM: was last done  with normal results s/p double mastectomy     PAP SMEAR: was last done  with normal results     Prolia Injection : 1st injection 19, last injection 20         PAST MEDICAL HISTORY             CURRENT MEDICAL PROVIDERS:    General Surgeon: JOSE    Ophthalmologist: LISETTE    GLUCOMA SPECIALIST- [facility]         Surgical History:         Colon Resection: ; diveerticulitis;     bilateral mastectomy , ;     Coronary Artery Stent Placement: 2007; CARDIOLYTE STRESS TEST-NORMAL IN          Family History:     Father:  at age 82; Cause of death was cva     Mother:  at age 81; Cause of death was colon cancer     Brother(s): 1 brother(s) total;  Hyperlipidemia     Sister(s): 2 sister(s) total;  Hyperlipidemia     Positive for Ovarian Cancer ( mat. GM ).          Social History:          Household:  Lives with her lives alone.  Occupation: Retired (Prior occupation: )     Marital Status:      Children: 4 children         Tobacco/Alcohol/Supplements:     Last Reviewed on 8/25/2020 11:17 AM by Ofe Koehler    Tobacco: She has never smoked.          Alcohol:  Does not drink alcohol and never has.          Substance Abuse History:     Last Reviewed on 12/20/2018 06:54 PM by Ana Sage        Mental Health History:     Last Reviewed on 12/20/2018 06:54 PM by Ana Sage        Communicable Diseases (eg STDs):     Last Reviewed on 12/20/2018 06:54 PM by Ana Sage        Allergies:     Last Reviewed on 8/25/2020 11:17 AM by Ofe Koehler    Clindamycin HCl:      Penicillins:      Sulfas:      Codeine:      Cleocin:      Zithromax Z-Mayank:      Bactrim:          Current Medications:     Last Reviewed on 8/25/2020 11:19 AM by Ofe Koehler    Systane Ultra (PF) 0.4-0.3 % ophthalmic (eye) Dropperette [1 DROP BILATERAL, DAILY]    Multivitamin/Mineral Supplement  Tablet [one a day]    aspirin 81 mg oral tablet,chewable [one a day]    levothyroxine 50 mcg oral tablet [TAKE 1 TABLET DAILY]    Prolia 60 mg/mL subcutaneous Syringe [every 6 months]    ferrous sulfate 325mg Tablets [1 TAB twice weekly]    Vitamin D3 2,000 unit oral capsule [1 capsule daily]    OTC Fish Oil with Omega 3 1000mg/300mg take 2 daily     amLODIPine 5 mg oral tablet [TAKE 1 TABLET DAILY]    Timolol Maleate     losartan 50 mg oral tablet [TAKE 1 TABLET DAILY]    Stiolto Respimat 2.5-2.5 mcg/actuation Inhalation Mist [inhale 2 puffs by inhalation route once daily at the same time each day]    My MDI Portable Nebuliser device  [attach to inhaler to use]        Objective:        Vitals:         Current: 8/25/2020 11:22:39 AM    Ht:  5 ft, 2.5 in;  Wt: 132.4 lbs;  BMI: 23.8T: 98.9 F (temporal);  BP: 190/112 mm Hg (left leg, sitting);  P: 96 bpm (left leg (BP Cuff), sitting);  sCr: 1.06 mg/dL;  GFR:  38.33        Repeat:     11:23:4 AM  BP:   137/82mm Hg (left arm, sitting, P-84)     Exams:     PHYSICAL EXAM:     GENERAL: vital signs recorded - well developed, well nourished;  no apparent distress;     NECK: supp;e;     RESPIRATORY: CTA B WITHOUT WHEEZING/RALES OR RHONCHI, NORMAL RESP. EFFORT     CARDIOVASCULAR: normal rate; rhythm is regular;  no systolic murmur; no edema;     MUSCULOSKELETAL: gait: slowed and unsteady;     NEUROLOGIC: mental status: oriented to person, place, and time;  GROSSLY INTACT     PSYCHIATRIC: appropriate affect and demeanor; normal thought and perception;         Assessment:         J43.9   Emphysema, unspecified       E83.52   Hypercalcemia       N18.2   Chronic kidney disease, stage 2 (mild)       R82.90   Unspecified abnormal findings in urine           ORDERS:         Lab Orders:       [id number]  PTHIN - [facility] PTH, intact  (Send-Out)            12428  BDUAM - [facility] Urinalysis, automated, with micro  (Send-Out)            23940  BMP - [facility] Basic Metabolic Panel  (Send-Out)                      Plan:         Emphysema, unspecifiedpt educated on dx, her lungs are improved on stilolto        Hypercalcemia    LABORATORY:  Labs ordered to be performed today include PTH intact.            Orders:       [id number]  PTHIN - [facility] PTH, intact  (Send-Out)              Chronic kidney disease, stage 2 (mild)advised to push fluids and avoid NSAIDs and PPIsI will check her urine and r/o UTI    LABORATORY:  Labs ordered to be performed today include basic metabolic panel.            Orders:       [id number]  BMP - [facility] Basic Metabolic Panel  (Send-Out)              Unspecified abnormal findings in urine    LABORATORY:  Labs ordered to be performed today include urinalysis with micro.            Orders:       [id number]  BDUAM - [facility] Urinalysis, automated, with micro  (Send-Out)                  Charge Capture:         Primary Diagnosis:     J43.9  Emphysema, unspecified           Orders:      [id number]  Office/outpatient visit;  established patient, level 4  (In-House)              E83.52  Hypercalcemia     N18.2  Chronic kidney disease, stage 2 (mild)     R82.90  Unspecified abnormal findings in urine

## 2021-05-18 NOTE — PROGRESS NOTES
Tracy Brooks 1935     Office/Outpatient Visit    Visit Date: Mon, Dec 31, 2018 11:36 am    Provider: Cathy Lebron MD (Assistant: Deven Batista)    Location: [facility]        Electronically signed by Cathy Lebron MD on  01/04/2019 05:21:47 PM                             SUBJECTIVE:        CC:     Ms. Brooks is a 83 year old White female.  had a fall, went to [place], had broken her t3 and t4 bones;         HPI:     Tracy fell again last week and hit her L shoulder and upper back on a stone step in her hallway, she went to the ER on 12/27 and had CT head, C spine, T spine performed and it showed a new T3 compression fx, old T8 compression fracture.  She was given a back brace and tramadol for pain, her pain is mild to moderate, and she states tylenol is helping her more than anything, so she is not taking the tramadol.   She has OP and has deferred meds in past and is ready to try treatment for this.     ROS:     CONSTITUTIONAL:  Negative for chills and fever.      CARDIOVASCULAR:  Negative for chest pain and palpitations.      RESPIRATORY:  Negative for recent cough and dyspnea.      GASTROINTESTINAL:  Negative for abdominal pain, nausea and vomiting.      INTEGUMENTARY/BREAST:  Negative for rash.      NEUROLOGICAL:  Negative for dizziness, headaches, paresthesias and weakness.      PSYCHIATRIC:  Negative for anxiety and depression.          PMH/FMH/SH:     Last Reviewed on 12/31/2018 12:13 PM by Cathy Lebron    Past Medical History: ENT-DR BARROSO, OPTHOMOLOGIST-DR AKHTAR, CARDIOLOGY-DR FREEMAN    ONCOLOGY DR SARABIA, SURGERY DR ANDERSON, GI DR GARCIA macular degeneration                 PAST MEDICAL HISTORY         Hypertension     Gastroesophageal Reflux Disease     Osteoporosis: takes fortaeo;     has three thyroid nodules     Breast cancer: dx'd in Right with mastectomy 2012, Left with mastectomy 2/2013;     eyes-macular degeneration eyes b, glaucoma L eye     Hip pain     Scalp  laceration     Emphysema, other     Use of high risk medications     Unspecified fall     Dizziness     Arm pain     Osteoporosis     Acquired hypothyroidism     Generalized osteoarthritis     History of breast cancer     Osteopenia     Family history of colon cancer      Essential hypercholesterolemia     chronic back pain    GERD     Thyroid nodule     Knee pain     Shoulder pain     Multiple thoracic compression fractures         GYNECOLOGICAL HISTORY:        Menopause at age 42.              ADVANCE DIRECTIVES: Living will on file, signed 16, ./pr         PREVENTIVE HEALTH MAINTENANCE             BONE DENSITY: was last done 18 with the following abnormality noted-- osteoporosis - declines Prolia     COLORECTAL CANCER SCREENING: Up to date (colonoscopy q10y; sigmoidoscopy q5y; Cologuard q3y) was last done 3/20/18, Results are in chart; colonoscopy with the following abnormalities noted-- diverticulosis     EYE EXAM: was last done 2018 macular degeneration and glaucoma     MAMMOGRAM: was last done  with normal results s/p double mastectomy     PAP SMEAR: was last done  with normal results         Surgical History:         Colon Resection: ; diveerticulitis;     bilateral mastectomy , ;      Coronary Artery Stent Placement: 2007; CARDIOLYTE STRESS TEST-NORMAL IN          Family History:     Father:  at age 82; Cause of death was cva     Mother:  at age 81; Cause of death was colon cancer     Brother(s): 1 brother(s) total;  Hyperlipidemia     Sister(s): 2 sister(s) total;  Hyperlipidemia     Positive for Ovarian Cancer ( mat. GM ).          Social History:         Household:  Lives with her lives alone.  Occupation: Retired (Prior occupation: )     Marital Status:      Children: 4 children         Tobacco/Alcohol/Supplements:     Last Reviewed on 2018 12:13 PM by Cathy Lebron    Tobacco: She has never smoked.          Alcohol:   Does not drink alcohol and never has.          Substance Abuse History:     Last Reviewed on 12/20/2018 06:54 PM by Ana Sage        Mental Health History:     Last Reviewed on 12/20/2018 06:54 PM by Ana Sage        Communicable Diseases (eg STDs):     Last Reviewed on 12/20/2018 06:54 PM by Ana Sage            Allergies:     Last Reviewed on 12/20/2018 06:54 PM by Ana Sage    Clindamycin HCl:    Penicillins:    Sulfas:    Codeine:    Cleocin:    Zithromax Z-Mayank:    Bactrim:        Current Medications:     Last Reviewed on 12/20/2018 06:54 PM by Ana Sage    Levothyroxine Sodium 50mcg Capsules 1 p.o. daily     Amlodipine  5mg Tablet 1 tab daily     Atorvastatin Calcium 20mg Tablet 1 tab daily     Losartan/Hydrochlorothiazide 50mg/12.5mg Tablet 1 tab daily     Ferrous Sulfate 325mg Tablets 1 TAB DAILY     Aspirin 81mg Chewable Tablet one a day     Multivitamin/Mineral Supplement Tablet one a day     Timolol Maleate     OTC Fish Oil with Omega 3 1000mg/300mg take 2 daily     Vitamin D3 2,000IU Capsules 1 capsule daily         OBJECTIVE:        Vitals:         Current: 12/31/2018 11:45:35 AM    Ht:  5 ft, 2.5 in;  Wt: 131.4 lbs;  BMI: 23.7    T: 98.3 F (oral);  BP: 120/68 mm Hg (right arm, sitting);  P: 85 bpm (right radial, sitting);  sCr: 0.84 mg/dL;  GFR: 49.01        Exams:     PHYSICAL EXAM:     GENERAL: vital signs recorded - well developed, well nourished;  no apparent distress;     EYES: PERRL, EOMI     E/N/T: EARS:  normal external auditory canals and tympanic membranes;  grossly normal hearing; OROPHARYNX:  normal mucosa, dentition, gingiva, and posterior pharynx;     NECK: range of motion is normal; thyroid is non-palpable;     RESPIRATORY: normal respiratory rate and pattern with no distress; normal breath sounds with no rales, rhonchi, wheezes or rubs;     CARDIOVASCULAR: normal rate; rhythm is regular;  no systolic murmur; 1+ pedal edema;     GASTROINTESTINAL:  nontender, nondistended; no hepatosplenomegaly or masses; no bruits;     MUSCULOSKELETAL: gait: slowed;  wearing her back brace; 5/5 LE and UE MS strength, normal symm sensory exam LE/UE, 2/4 patellar and brachioradialis DTR B, neg straight leg raise B.     NEUROLOGIC: mental status: oriented to person, place, and time;  Reflexes: knee jerks: 2+;  GROSSLY INTACT     PSYCHIATRIC: appropriate affect and demeanor; normal thought and perception;         ASSESSMENT           V15.88   Z91.81  History of fall              DDx:     805.2   S22.000D  T3 compression fracture              DDx:     733.09   M81.8   M81.6  Osteoporosis, other              DDx:         ORDERS:         Meds Prescribed:       Prolia (Denosumab) 60mg/1ml Injection give SQ q 6months  #2 (Two) syringe(s) Refills: 1                 PLAN:          History of fall due to her foot getting hung up          T3 compression fracture CT T spine showed minimally displaced fx T3  in axial plane of posterior T3 with moderate T3 compression fx, pt has appt with [facility] [place] 1/7 for further eval Her pain is controlled with tylenol alone, she has tramadol for prn severe pain.  She is eating ok, BM are stable and unchanged. She has good family support          Osteoporosis, other           Prescriptions:       Prolia (Denosumab) 60mg/1ml Injection give SQ q 6months  #2 (Two) syringe(s) Refills: 1             CHARGE CAPTURE           **Please note: ICD descriptions below are intended for billing purposes only and may not represent clinical diagnoses**        Primary Diagnosis:         V15.88 History of fall            Z91.81    History of falling              Orders:          33175   Office/outpatient visit; established patient, level 4  (In-House)           805.2 T3 compression fracture            S22.000D    [place]edge compression fracture of unspecified thoracic vertebra, subsequent encounter for fracture with routine healing    733.09 Osteoporosis,  other            M81.8    Other osteoporosis without current pathological fracture           M81.6    Localized osteoporosis [[place]]

## 2021-05-18 NOTE — PROGRESS NOTES
Tracy Brooks  1935     Office/Outpatient Visit    Visit Date: Thu, Jul 30, 2020 10:17 am    Provider: Cathy Lebron MD (Assistant: Dejah Quesada MA)    Location: [facility]        Electronically signed by Cathy Lebron MD on  08/10/2020 10:34:28 AM                             Subjective:        CC: phone call [phone] refills, BP follow up    HPI:           Patient presents with essential (primary) hypertension.  Current nonpharmacologic treatment includes low sodium diet.  Her current cardiac medication regimen includes a diuretic, a calcium channel blocker, and an angiotensin receptor blocker.  Compliance with treatment has been good; she takes her medication as directed.  She is tolerating the medication well without side effects.  She has not kept a blood pressure diary, but states that pressures have been well controlled.            Dx with other specified hypothyroidism; she is currently taking Synthroid, 50 mcg daily.  TSH was last checked 6 months ago.  The result was reported as normal.            Pure hypercholesterolemia, unspecified details; current treatment includes Lipitor and a low cholesterol/low fat diet.  Compliance with treatment has been good; she takes her medication as directed.  She denies experiencing any hypercholesterolemia related symptoms.      ROS:     CONSTITUTIONAL:  Positive for fatigue.   Negative for chills or fever.      EYES:  Negative for blurred vision and eye pain.      E/N/T:  Negative for ear pain and nasal congestion.      CARDIOVASCULAR:  Negative for chest pain and palpitations.      RESPIRATORY:  Negative for recent cough and dyspnea.      GASTROINTESTINAL:  Negative for abdominal pain, nausea and vomiting.      INTEGUMENTARY/BREAST:  Negative for rash.      NEUROLOGICAL:  Positive for ONGOING SHORT TERM MEMORY ISSUES.   Negative for dizziness, headaches or weakness.      PSYCHIATRIC:  Negative for anxiety and depression.           Past Medical History / Family History / Social History:         Last Reviewed on 2020 02:48 PM by Cathy Lebron    Past Medical History: ENT-DR BARROSO, OPTHOMOLOGIST-DR AKHTAR, CARDIOLOGY-DR FREEMAN    ONCOLOGY DR SARABIA, SURGERY DR ANDERSON, GI DR GARCIA macular degeneration                 PAST MEDICAL HISTORY         Hypertension     Gastroesophageal Reflux Disease     Osteoporosis: takes fortaeo;     has three thyroid nodules     Breast cancer: dx'd in Right with mastectomy , Left with mastectomy 2013;     eyes-macular degeneration eyes b, glaucoma L eye     Hip pain     Scalp laceration     Emphysema, other     Use of high risk medications     Unspecified fall     Dizziness     Arm pain     Osteoporosis     Acquired hypothyroidism     Generalized osteoarthritis     History of breast cancer     Osteopenia     Family history of colon cancer      Essential hypercholesterolemia     chronic back pain    GERD     Thyroid nodule     Knee pain     Shoulder pain     Multiple thoracic compression fractures         GYNECOLOGICAL HISTORY:        Menopause at age 42.              ADVANCE DIRECTIVES: Living will on file, signed 16, ./pr         PREVENTIVE HEALTH MAINTENANCE             BONE DENSITY: was last done 18 with the following abnormality noted-- osteoporosis - declines Prolia     COLORECTAL CANCER SCREENING: Up to date (colonoscopy q10y; sigmoidoscopy q5y; Cologuard q3y) was last done 3/20/18, Results are in chart; colonoscopy with the following abnormalities noted-- diverticulosis; 3/20/18     EYE EXAM: was last done 20 macular degeneration and glaucoma     MAMMOGRAM: was last done  with normal results s/p double mastectomy     PAP SMEAR: was last done  with normal results     Prolia Injection : 1st injection 19, last injection 20         PAST MEDICAL HISTORY             CURRENT MEDICAL PROVIDERS:    General Surgeon: JOSE    Ophthalmologist: LISETTE     GLUCOMA SPECIALIST- [facility]         Surgical History:         Colon Resection: ; diveerticulitis;     bilateral mastectomy , ;     Coronary Artery Stent Placement: 2007; CARDIOLYTE STRESS TEST-NORMAL IN          Family History:     Father:  at age 82; Cause of death was cva     Mother:  at age 81; Cause of death was colon cancer     Brother(s): 1 brother(s) total;  Hyperlipidemia     Sister(s): 2 sister(s) total;  Hyperlipidemia     Positive for Ovarian Cancer ( mat. GM ).          Social History:         Household:  Lives with her lives alone.  Occupation: Retired (Prior occupation: )     Marital Status:      Children: 4 children         Tobacco/Alcohol/Supplements:     Last Reviewed on 2020 02:15 PM by Maria Del Rosario Baptiste    Tobacco: She has never smoked.          Alcohol:  Does not drink alcohol and never has.          Substance Abuse History:     Last Reviewed on 2018 06:54 PM by Ana Sage        Mental Health History:     Last Reviewed on 2018 06:54 PM by Ana Sage        Communicable Diseases (eg STDs):     Last Reviewed on 2018 06:54 PM by Ana Sage        Allergies:     Last Reviewed on 2020 02:15 PM by Maria Del Rosario Baptiste    Clindamycin HCl:      Penicillins:      Sulfas:      Codeine:      Cleocin:      Zithromax Z-Mayank:      Bactrim:          Current Medications:     Last Reviewed on 2020 02:48 PM by Cathy Lebron    Systane Ultra (PF) 0.4-0.3 % ophthalmic (eye) Dropperette [1 DROP BILATERAL, DAILY]    Multivitamin/Mineral Supplement  Tablet [one a day]    aspirin 81 mg oral tablet,chewable [one a day]    levothyroxine 50 mcg oral tablet [TAKE 1 TABLET DAILY]    Prolia 60 mg/mL subcutaneous Syringe [every 6 months]    ferrous sulfate 325mg Tablets [1 TAB twice weekly]    Vitamin D3 2,000 unit oral capsule [1 capsule daily]    OTC Fish Oil with Omega 3 1000mg/300mg take 2 daily     amLODIPine 5 mg oral  tablet [TAKE 1 TABLET DAILY]    Timolol Maleate     losartan 50 mg oral tablet [TAKE 1 TABLET DAILY]        Objective:        Vitals:         Current: 7/30/2020 11:06:40 AM    Ht:  5 ft, 2.5 inT: 98.2 F (oral);  BP: 127/82 mm Hg (right arm, sitting);  sCr: 0.81 mg/dL;  GFR: 53.78        Assessment:         I10   Essential (primary) hypertension       E03.8   Other specified hypothyroidism       E78.00   Pure hypercholesterolemia, unspecified       R60.0   Localized edema       D50.9   Iron deficiency anemia, unspecified       K21.9   Gastro-esophageal reflux disease without esophagitis       R06.02   Shortness of breath           ORDERS:         Lab Orders:       [id number]  HTNLP - [facility] CMP AND LIPID: [id number], [id number]  (Send-Out)            48062  THYII - [facility] Thyroid panel with TSH (57733, 23620)  (Send-Out)            26576  VB12 - [facility] Vitamin B12  (Send-Out)            78762  BDCB2 - [facility] CBC w/o diff  (Send-Out)            67053  IRON - [facility] Iron, serum  (Send-Out)                      Plan:         Essential (primary) hypertension    LABORATORY:  Labs ordered to be performed today include HTN/Lipid Panel: CMP, Lipid.      RECOMMENDATIONS given include: avoidance of caffeine, avoidance of cigarette smoke, keep blood pressure log as directed, healthy carb, high healthy protein and high fiber diet, avoid salt in her diet, f/u every 6 months for BP checks and labs, and exercise 30 min 3-4 days a week.  Telehealth: Verbal consent obtained for visit to occur via phone call; Staff, other than provider, present during telephone visit include Dr Lebron and patient; Total time spent was 14.5 minutes; 33948--Gtxmyjwxt E/M 11-20 minutes           Orders:       74661  HTNLP - [facility] CMP AND LIPID: [id number], [id number]  (Send-Out)              Other specified hypothyroidism    LABORATORY:  Labs ordered to be performed today include Thyroid Panel.            Orders:       29879  THYII - [facility] Thyroid panel with TSH (16029, 49438)  (Send-Out)           "    Pure hypercholesterolemia, unspecifiedoff atorvastatin due to age, last chol was 145-will check her lipids today.         Localized edemamild, she is to watch salt in her diet and ok to use compression stockings prn        Iron deficiency anemia, unspecifiedintermittent, she hardly ever has this, had an episode last night with pressure in her back b/w her shoulder blades.  I told her if this occurs and \"intensifies\" she will need to be seen by me or go to ER    LABORATORY:  Labs ordered to be performed today include B12, CBC W/O DIFF, and Iron Serum.            Orders:       73269  VB12 - [facility] Vitamin B12  (Send-Out)            97894  BDCB2 - [facility] CBC w/o diff  (Send-Out)            20705  IRON - [facility] Iron, serum  (Send-Out)              Gastro-esophageal reflux disease without esophagitisintermittent, ok with tums        Shortness of breathmild but with her back pain and LE edema, I am going to have her CI and see me next week to work up her heart.            Patient Recommendations:        For  Essential (primary) hypertension:    Try to avoid or reduce the amount of caffeine intake. Avoid cigarette smoke. Keep a daily blood pressure log and report elevated blood pressure to provider as directed. Drink plenty of fluids.  Fever increases the loss of fluids and can lead to dehydration.              Charge Capture:         Primary Diagnosis:     I10  Essential (primary) hypertension           Orders:      31780  Phys/QHP telephone evaluation 11-20 minutes  (In-House)              E03.8  Other specified hypothyroidism     E78.00  Pure hypercholesterolemia, unspecified     R60.0  Localized edema     D50.9  Iron deficiency anemia, unspecified     K21.9  Gastro-esophageal reflux disease without esophagitis     R06.02  Shortness of breath            "

## 2021-06-01 ENCOUNTER — HOSPITAL ENCOUNTER (OUTPATIENT)
Dept: OTHER | Facility: HOSPITAL | Age: 86
Discharge: HOME | End: 2021-06-01
Attending: FAMILY MEDICINE

## 2021-06-01 ENCOUNTER — OFFICE VISIT CONVERTED (OUTPATIENT)
Dept: FAMILY MEDICINE CLINIC | Age: 86
End: 2021-06-01
Attending: FAMILY MEDICINE

## 2021-06-01 LAB
APPEARANCE UR: CLEAR
BACTERIA UR QL AUTO: ABNORMAL
BILIRUB UR QL: NEGATIVE
CASTS URNS QL MICRO: ABNORMAL /[LPF]
COLOR UR: YELLOW
CONV LEUKOCYTE ESTERASE: ABNORMAL
CONV UROBILINOGEN IN URINE BY AUTOMATED TEST STRIP: 0.2 {EHRLICHU}/DL (ref 0.1–1)
EPI CELLS #/AREA URNS HPF: ABNORMAL /[HPF]
ERYTHROCYTE [DISTWIDTH] IN BLOOD BY AUTOMATED COUNT: 14 % (ref 11.5–14.5)
GLUCOSE 24H UR-MCNC: NEGATIVE MG/DL
HBA1C MFR BLD: 12.7 G/DL (ref 12–16)
HCT VFR BLD AUTO: 39.9 % (ref 37–47)
HGB UR QL STRIP: NEGATIVE
KETONES UR QL STRIP: NEGATIVE MG/DL
MCH RBC QN AUTO: 32.1 PG (ref 27–31)
MCHC RBC AUTO-ENTMCNC: 31.8 G/DL (ref 33–37)
MCV RBC AUTO: 100.8 FL (ref 81–99)
MUCOUS THREADS URNS QL MICRO: ABNORMAL
NITRITE UR-MCNC: NEGATIVE MG/ML
PH UR STRIP.AUTO: 7 [PH] (ref 5–8)
PLATELET # BLD AUTO: 153 10*3/UL (ref 130–400)
PMV BLD AUTO: 10.1 FL (ref 7.4–10.4)
PROT UR-MCNC: NEGATIVE MG/DL
RBC # BLD AUTO: 3.96 10*6/UL (ref 4.2–5.4)
RBC # BLD AUTO: ABNORMAL /[HPF]
SP GR UR STRIP: 1.02 (ref 1–1.03)
SPECIMEN SOURCE: ABNORMAL
UNIDENT CRYS URNS QL MICRO: ABNORMAL /[HPF]
WBC # BLD AUTO: 6.12 10*3/UL (ref 4.8–10.8)
WBC #/AREA URNS HPF: ABNORMAL /[HPF]

## 2021-06-02 LAB
ALBUMIN SERPL-MCNC: 4.1 G/DL (ref 3.5–5)
ALBUMIN/GLOB SERPL: 1.2 {RATIO} (ref 1.4–2.6)
ALP SERPL-CCNC: 91 U/L (ref 43–160)
ALT SERPL-CCNC: 14 U/L (ref 10–40)
ANION GAP SERPL CALC-SCNC: 16 MMOL/L (ref 8–19)
AST SERPL-CCNC: 19 U/L (ref 15–50)
BILIRUB SERPL-MCNC: 0.51 MG/DL (ref 0.2–1.3)
BUN SERPL-MCNC: 12 MG/DL (ref 5–25)
BUN/CREAT SERPL: 13 {RATIO} (ref 6–20)
CALCIUM SERPL-MCNC: 9.8 MG/DL (ref 8.7–10.4)
CHLORIDE SERPL-SCNC: 101 MMOL/L (ref 99–111)
CONV CO2: 26 MMOL/L (ref 22–32)
CONV TOTAL PROTEIN: 7.5 G/DL (ref 6.3–8.2)
CREAT UR-MCNC: 0.91 MG/DL (ref 0.5–0.9)
GFR SERPLBLD BASED ON 1.73 SQ M-ARVRAT: 58 ML/MIN/{1.73_M2}
GLOBULIN UR ELPH-MCNC: 3.4 G/DL (ref 2–3.5)
GLUCOSE SERPL-MCNC: 100 MG/DL (ref 65–99)
IRON SERPL-MCNC: 34 UG/DL (ref 60–170)
OSMOLALITY SERPL CALC.SUM OF ELEC: 286 MOSM/KG (ref 273–304)
POTASSIUM SERPL-SCNC: 4.8 MMOL/L (ref 3.5–5.3)
SODIUM SERPL-SCNC: 138 MMOL/L (ref 135–147)
T4 FREE SERPL-MCNC: 1.6 NG/DL (ref 0.9–1.8)
TSH SERPL-ACNC: 1.9 M[IU]/L (ref 0.27–4.2)
VIT B12 SERPL-MCNC: 591 PG/ML (ref 211–911)

## 2021-06-05 NOTE — PROGRESS NOTES
Tracy Brooks  1935     Office/Outpatient Visit    Visit Date: Tue, Jun 1, 2021 10:10 am    Provider: Cathy Lebron MD (Assistant: Dejah Quesada MA)    Location: [facility]        Electronically signed by Cathy Lebron MD on  06/04/2021 08:31:22 AM                             Subjective:        CC: Ms. Brooks is a 85 year old White female.  She is here today following a transition of care from an inpatient hospital: [facility]. The patient was admitted on 5-5-21, discharged 5-7-21 to [facility], discharged home on 5-22-21. Our office called the patient within 48 hours of discharge and scheduled the follow-up appointment.. During the patient's hospital stay the patient was treated by Dr. Dorsey and from a skilled nursing facility.          HPI:       Tracy had an acute episode of severe weakness on 5/4-she had been feeling good prior to that, she was sitting on the commode and could not stand up, and her R knee was swollen (NKI), she sat on toilet for 3 hours, her daughter happened to come by and found her, and took her to the couch and she walked really funny, and she left her and the next day she was still weak so she went to ther ER on 5/5/21, she was admitted with a UTI, WBC 7.1, HCT 36.5, iron was 14, CXR was negative-mild cardiomegaly, knee xray showed patellofemoral arthritis.    She was treated with rocephin ABX, and transfered to [place] for the weakness so she could get PT.   She was d/c from the hospital on levothyroxine 50 mg daily, lostartan/hctz, amlodipine, asa, vitamin D3 2000 units a day, MVI, fish oil, timolol and newly started on iron 325 mg bid.  She did well with PT and OT and was d/c home with home health on Sat the 23rd in good condition and with home health for PT/OT and nursing.         No issues with iron, no constipation          Additionally, she presents with history of essential (primary) hypertension.  current nonpharmacologic treatment  includes low sodium diet.  Her current cardiac medication regimen includes a diuretic, a calcium channel blocker, and an angiotensin receptor blocker.  Compliance with treatment has been good; she takes her medication as directed.  She is tolerating the medication well without side effects.  She has not kept a blood pressure diary, but states that pressures have been well controlled.            Additionally, she presents with history of other specified hypothyroidism.  this was first diagnosed more than 6 months ago.  She is currently taking Levothyroid, 50 mcg daily.  TSH was last checked two months ago.  The result was reported as normal.  She denies any related symptoms.  She reports no symptoms suggestive of adverse medication effect.      ROS:     CONSTITUTIONAL:  Positive for fatigue.   Negative for chills or fever.      EYES:  Negative for blurred vision and eye pain.      E/N/T:  Negative for ear pain and nasal congestion.      CARDIOVASCULAR:  Negative for chest pain and palpitations.      RESPIRATORY:  Negative for recent cough and dyspnea.      GASTROINTESTINAL:  Negative for abdominal pain, nausea and vomiting.      INTEGUMENTARY/BREAST:  Negative for rash.      NEUROLOGICAL:  Positive for ONGOING SHORT TERM MEMORY ISSUES.   Negative for dizziness, headaches or weakness.      PSYCHIATRIC:  Negative for anxiety and depression.          Past Medical History / Family History / Social History:         Last Reviewed on 6/01/2021 10:57 AM by Cathy Lebron    Past Medical History: ENT-DR BARROSO, OPTHOMOLOGIST-DR AKHTAR, CARDIOLOGY-DR FREEMAN    ONCOLOGY DR SARABIA, SURGERY DR ANDERSON, GI DR GARCIA macular degeneration                 PAST MEDICAL HISTORY         Hypertension     Gastroesophageal Reflux Disease     Osteoporosis: takes fortaeo;     has three thyroid nodules     Breast cancer: dx'd in Right with mastectomy 2012, Left with mastectomy 2/2013;     eyes-macular degeneration eyes b, glaucoma L eye      Hip pain     Scalp laceration     Emphysema, other     Use of high risk medications     Unspecified fall     Dizziness     Arm pain     Osteoporosis     Acquired hypothyroidism     Generalized osteoarthritis     History of breast cancer     Osteopenia     Family history of colon cancer      Essential hypercholesterolemia     chronic back pain    GERD     Thyroid nodule     Knee pain     Shoulder pain     Multiple thoracic compression fractures         GYNECOLOGICAL HISTORY:        Menopause at age 42.              ADVANCE DIRECTIVES: Living will on file, signed 16, ./pr         PREVENTIVE HEALTH MAINTENANCE             BONE DENSITY: was last done 21 with the following abnormality noted-- osteoporosis - declines Prolia     COLORECTAL CANCER SCREENING: Up to date (colonoscopy q10y; sigmoidoscopy q5y; Cologuard q3y) was last done 3/20/18, Results are in chart; colonoscopy with the following abnormalities noted-- diverticulosis; 3/20/18     EYE EXAM: was last done 20 macular degeneration and glaucoma     MAMMOGRAM: was last done  with normal results s/p double mastectomy     PAP SMEAR: was last done  with normal results     Prolia Injection : 1st injection 19, last injection 10/16/20         PAST MEDICAL HISTORY             CURRENT MEDICAL PROVIDERS:    General Surgeon: JOSE    Ophthalmologist: LISETTE    GLUCOMA SPECIALIST- [facility]         Surgical History:         Colon Resection: ; diveerticulitis;     bilateral mastectomy , ;     Coronary Artery Stent Placement: 2007; CARDIOLYTE STRESS TEST-NORMAL IN          Family History:     Father:  at age 82; Cause of death was cva     Mother:  at age 81; Cause of death was colon cancer     Brother(s): 1 brother(s) total;  Hyperlipidemia     Sister(s): 2 sister(s) total;  Hyperlipidemia     Positive for Ovarian Cancer ( mat. GM ).          Social History:         Household:  Lives with her lives alone.   Occupation: Retired (Prior occupation: )     Marital Status:      Children: 4 children         Tobacco/Alcohol/Supplements:     Last Reviewed on 6/01/2021 10:15 AM by Dejah Quesada    Tobacco: She has never smoked.          Alcohol:  Does not drink alcohol and never has.          Substance Abuse History:     Last Reviewed on 12/20/2018 06:54 PM by Ana Sage        Mental Health History:     Last Reviewed on 12/20/2018 06:54 PM by Ana Sage        Communicable Diseases (eg STDs):     Last Reviewed on 12/20/2018 06:54 PM by Ana Sage        Allergies:     Last Reviewed on 1/28/2021 02:01 PM by Dejah Quesada    Clindamycin HCl:      Penicillins:      Sulfas:      Codeine:      Cleocin:      Zithromax Z-Mayank:      Bactrim:          Current Medications:     Last Reviewed on 1/28/2021 02:01 PM by Dejah Quesada    Systane Ultra (PF) 0.4-0.3 % ophthalmic (eye) Dropperette [1 DROP BILATERAL, DAILY]    Multivitamin/Mineral Supplement  Tablet [one a day]    aspirin 81 mg oral tablet,chewable [one a day]    levothyroxine 50 mcg oral tablet [TAKE 1 TABLET DAILY]    Prolia 60 mg/mL subcutaneous Syringe [every 6 months]    Vitamin D3 2,000 unit oral capsule [1 capsule daily]    OTC Fish Oil with Omega 3 1000mg/300mg take 2 daily     amLODIPine 5 mg oral tablet [TAKE 1 TABLET DAILY]    Timolol Maleate     losartan 50 mg oral tablet [TAKE 1 TABLET DAILY]    Stiolto Respimat 2.5-2.5 mcg/actuation Inhalation Mist [inhale 2 puffs by inhalation route once daily at the same time each day]    My MDI Portable Nebuliser device  [attach to inhaler to use]    POLY-IRON 150 MG CAPSULE    VITAMIN D3 1,000 UNIT TABLET        Objective:        Vitals:         Current: 6/1/2021 10:16:24 AM    Ht:  5 ft, 2.5 in;  Wt: 128.2 lbs;  BMI: 23.1T: 96.3 F (temporal);  BP: 142/67 mm Hg (right arm, sitting);  P: 88 bpm (right arm (BP Cuff), sitting);  sCr: 0.81 mg/dL;  GFR: 48.66        Exams:     PHYSICAL EXAM:      GENERAL: vital signs recorded - well developed, well nourished;  no apparent distress;     EYES: PERRL, EOMI     E/N/T: OROPHARYNX:  normal mucosa, dentition, gingiva, and posterior pharynx;     NECK: supp;e;     RESPIRATORY: CTA B WITHOUT WHEEZING/RALES OR RHONCHI, NORMAL RESP. EFFORT     CARDIOVASCULAR: normal rate; rhythm is regular;  no systolic murmur; no edema;     MUSCULOSKELETAL: gait: slowed and unsteady;  tender to palpation over coccyx;     NEUROLOGIC: mental status: oriented to person, place, and time;  GROSSLY INTACT     PSYCHIATRIC: appropriate affect and demeanor; normal thought and perception;         Assessment:         D50.9   Iron deficiency anemia, unspecified       I10   Essential (primary) hypertension       Z85.3   Personal history of malignant neoplasm of breast       E03.8   Other specified hypothyroidism       N39.0   Urinary tract infection, site not specified       R63.4   Abnormal weight loss       M81.0   Age-related osteoporosis without current pathological fracture           ORDERS:         Meds Prescribed:       [Recorded] alendronate 70 mg oral tablet [take 1 tablet (70 mg) by oral route once weekly in the morning, at least 30 min before first food, beverage, or medication of day]       [Refilled] alendronate 70 mg oral tablet [take 1 tablet (70 mg) by oral route once weekly in the morning, at least 30 min before first food, beverage, or medication of day], #13 (thirteen) tablets, Refills: 3 (three)         Lab Orders:       78643  VB12 - [facility] Vitamin B12  (Send-Out)            89708  BDCB2 - [facility] CBC w/o diff  (Send-Out)            41781  IRON - [facility] Iron, serum  (Send-Out)            47373  BDUAM - [facility] Urinalysis, automated, with micro  (Send-Out)            91811  COMP - [facility] Comp. Metabolic Panel  (Send-Out)            22886  THYII - [facility] Thyroid panel with TSH (18142, 58418)  (Send-Out)                      Plan:         Iron deficiency anemia, unspecified    LABORATORY:  Labs  ordered to be performed today include B12, CBC W/O DIFF, and Iron Serum.            Orders:       66919  VB12 - [facility] Vitamin B12  (Send-Out)            53887  BDCB2 - [facility] CBC w/o diff  (Send-Out)            76882  IRON - [facility] Iron, serum  (Send-Out)              Essential (primary) hypertensionstable, no change in meds    LABORATORY:  Labs ordered to be performed today include Comprehensive metabolic panel.            Orders:       [id number]  COMP - [facility] Comp. Metabolic Panel  (Send-Out)              Personal history of malignant neoplasm of breastshe has f/u with oncology this week        Other specified hypothyroidismstable on meds    LABORATORY:  Labs ordered to be performed today include Thyroid Panel.            Orders:       87494  THYII - [facility] Thyroid panel with TSH (88156, 45864)  (Send-Out)              Urinary tract infection, site not specified    LABORATORY:  Labs ordered to be performed today include urinalysis with micro.            Orders:       71616  BDUAM - [facility] Urinalysis, automated, with micro  (Send-Out)              Abnormal weight losspush protein supplementation, pt and daughter educated, ok to do protein bars and shakes        Age-related osteoporosis without current pathological fractureher insurance no loner covers the prolia so I will change her to alendronate weekly          Prescriptions:       [Recorded] alendronate 70 mg oral tablet [take 1 tablet (70 mg) by oral route once weekly in the morning, at least 30 min before first food, beverage, or medication of day]       [Refilled] alendronate 70 mg oral tablet [take 1 tablet (70 mg) by oral route once weekly in the morning, at least 30 min before first food, beverage, or medication of day], #13 (thirteen) tablets, Refills: 3 (three)             Charge Capture:         Primary Diagnosis:     D50.9  Iron deficiency anemia, unspecified           Orders:      46460  Transitional care manage service 14 day discharge  (In-House)              I10   Essential (primary) hypertension     Z85.3  Personal history of malignant neoplasm of breast     E03.8  Other specified hypothyroidism     N39.0  Urinary tract infection, site not specified     R63.4  Abnormal weight loss     M81.0  Age-related osteoporosis without current pathological fracture

## 2021-06-15 RX ORDER — LEVOTHYROXINE SODIUM 0.05 MG/1
50 TABLET ORAL DAILY
COMMUNITY
End: 2021-09-07

## 2021-06-15 RX ORDER — LOSARTAN POTASSIUM 50 MG/1
TABLET ORAL
Qty: 90 TABLET | Refills: 3 | OUTPATIENT
Start: 2021-06-15

## 2021-06-15 RX ORDER — MULTIVIT-MIN/IRON/FOLIC ACID/K 18-600-40
1 CAPSULE ORAL DAILY
COMMUNITY
End: 2021-11-30

## 2021-06-15 RX ORDER — MULTIPLE VITAMINS W/ MINERALS TAB 9MG-400MCG
1 TAB ORAL DAILY
COMMUNITY
End: 2021-11-30

## 2021-06-15 RX ORDER — AMLODIPINE BESYLATE 5 MG/1
5 TABLET ORAL DAILY
COMMUNITY
End: 2021-08-17

## 2021-06-15 RX ORDER — IRON POLYSACCHARIDE COMPLEX 150 MG
150 CAPSULE ORAL 2 TIMES DAILY
COMMUNITY
End: 2021-11-30

## 2021-06-15 RX ORDER — ALENDRONATE SODIUM 70 MG/1
70 TABLET ORAL
COMMUNITY
End: 2021-11-30

## 2021-06-15 RX ORDER — ASPIRIN 81 MG/1
81 TABLET, CHEWABLE ORAL DAILY
COMMUNITY

## 2021-06-28 ENCOUNTER — TELEPHONE (OUTPATIENT)
Dept: FAMILY MEDICINE CLINIC | Age: 86
End: 2021-06-28

## 2021-06-28 NOTE — TELEPHONE ENCOUNTER
Caller: Tracy Brooks    Relationship to patient: Self    Best call back number: [phone]    Patient is needing: PATIENT CALLED IN AND WOULD LIKE A CALL BACK FROM DR. ELLSWORTH'S NURSE. PLEASE CALL PATIENT AND ADVISE.

## 2021-07-01 VITALS
TEMPERATURE: 97.6 F | HEART RATE: 76 BPM | HEIGHT: 63 IN | BODY MASS INDEX: 23.35 KG/M2 | WEIGHT: 131.8 LBS | DIASTOLIC BLOOD PRESSURE: 62 MMHG | SYSTOLIC BLOOD PRESSURE: 105 MMHG

## 2021-07-01 VITALS
SYSTOLIC BLOOD PRESSURE: 122 MMHG | TEMPERATURE: 98.3 F | DIASTOLIC BLOOD PRESSURE: 72 MMHG | OXYGEN SATURATION: 98 % | HEIGHT: 63 IN | WEIGHT: 134.7 LBS | HEART RATE: 74 BPM | BODY MASS INDEX: 23.87 KG/M2

## 2021-07-01 VITALS
SYSTOLIC BLOOD PRESSURE: 128 MMHG | HEIGHT: 63 IN | WEIGHT: 133 LBS | BODY MASS INDEX: 23.57 KG/M2 | TEMPERATURE: 98.1 F | DIASTOLIC BLOOD PRESSURE: 60 MMHG | HEART RATE: 85 BPM

## 2021-07-01 VITALS
DIASTOLIC BLOOD PRESSURE: 72 MMHG | SYSTOLIC BLOOD PRESSURE: 132 MMHG | HEIGHT: 63 IN | BODY MASS INDEX: 23.87 KG/M2 | WEIGHT: 134.7 LBS | HEART RATE: 73 BPM | TEMPERATURE: 97.9 F

## 2021-07-01 VITALS
SYSTOLIC BLOOD PRESSURE: 133 MMHG | HEART RATE: 86 BPM | DIASTOLIC BLOOD PRESSURE: 62 MMHG | WEIGHT: 131.4 LBS | TEMPERATURE: 97.9 F | HEIGHT: 63 IN | BODY MASS INDEX: 23.28 KG/M2

## 2021-07-01 VITALS
SYSTOLIC BLOOD PRESSURE: 185 MMHG | HEART RATE: 74 BPM | WEIGHT: 131.6 LBS | DIASTOLIC BLOOD PRESSURE: 94 MMHG | OXYGEN SATURATION: 99 % | TEMPERATURE: 97.8 F | BODY MASS INDEX: 23.32 KG/M2 | HEIGHT: 63 IN

## 2021-07-01 VITALS
BODY MASS INDEX: 23.28 KG/M2 | DIASTOLIC BLOOD PRESSURE: 68 MMHG | WEIGHT: 131.4 LBS | SYSTOLIC BLOOD PRESSURE: 120 MMHG | TEMPERATURE: 98.3 F | HEART RATE: 85 BPM | HEIGHT: 63 IN

## 2021-07-02 VITALS
SYSTOLIC BLOOD PRESSURE: 120 MMHG | BODY MASS INDEX: 23.18 KG/M2 | TEMPERATURE: 97.9 F | HEART RATE: 85 BPM | WEIGHT: 130.8 LBS | DIASTOLIC BLOOD PRESSURE: 77 MMHG | HEIGHT: 63 IN

## 2021-07-02 VITALS
DIASTOLIC BLOOD PRESSURE: 85 MMHG | BODY MASS INDEX: 23.21 KG/M2 | SYSTOLIC BLOOD PRESSURE: 151 MMHG | TEMPERATURE: 97.6 F | HEART RATE: 85 BPM | HEIGHT: 63 IN | WEIGHT: 131 LBS

## 2021-07-02 VITALS
SYSTOLIC BLOOD PRESSURE: 146 MMHG | HEIGHT: 63 IN | DIASTOLIC BLOOD PRESSURE: 85 MMHG | WEIGHT: 132.2 LBS | BODY MASS INDEX: 23.42 KG/M2 | HEART RATE: 86 BPM | TEMPERATURE: 97.3 F

## 2021-07-02 VITALS
BODY MASS INDEX: 23.46 KG/M2 | HEART RATE: 96 BPM | WEIGHT: 132.4 LBS | SYSTOLIC BLOOD PRESSURE: 137 MMHG | DIASTOLIC BLOOD PRESSURE: 82 MMHG | TEMPERATURE: 98.9 F | HEIGHT: 63 IN

## 2021-07-02 VITALS
DIASTOLIC BLOOD PRESSURE: 82 MMHG | SYSTOLIC BLOOD PRESSURE: 127 MMHG | BODY MASS INDEX: 23.54 KG/M2 | HEIGHT: 63 IN | TEMPERATURE: 98.2 F

## 2021-07-02 VITALS
HEART RATE: 88 BPM | TEMPERATURE: 96.3 F | BODY MASS INDEX: 22.71 KG/M2 | HEIGHT: 63 IN | WEIGHT: 128.2 LBS | SYSTOLIC BLOOD PRESSURE: 142 MMHG | DIASTOLIC BLOOD PRESSURE: 67 MMHG

## 2021-07-19 ENCOUNTER — TELEPHONE (OUTPATIENT)
Dept: FAMILY MEDICINE CLINIC | Age: 86
End: 2021-07-19

## 2021-07-19 NOTE — TELEPHONE ENCOUNTER
Caller: Tracy Brooks    Relationship: Self    Best call back number:  [phone]    What is the best time to reach you: ANY    Who are you requesting to speak with (clinical staff, provider,  specific staff member): DR. ELLSWORTH'S NURSE        What was the call regarding: PATIENT HAS CALLED STATING SHE WAS TAKEN OFF LOSARTAN AND IS REQUESTING A CALL BACK TO UPDATE HER AS TO WHY.     Do you require a callback: YES

## 2021-07-26 ENCOUNTER — TELEPHONE (OUTPATIENT)
Dept: FAMILY MEDICINE CLINIC | Age: 86
End: 2021-07-26

## 2021-07-26 NOTE — TELEPHONE ENCOUNTER
Caller: Tracy Brooks    Relationship: Self    Best call back number: [phone]         Which medication are you concerned about:LOSARTAN    Who prescribed you this medication: DR ELLSWORTH    What are your concerns: PATIENT IS NOT SURE WHY SHE WAS TAKEN OFF OF THIS MEDICATION.

## 2021-08-10 ENCOUNTER — TELEPHONE (OUTPATIENT)
Dept: FAMILY MEDICINE CLINIC | Age: 86
End: 2021-08-10

## 2021-08-10 DIAGNOSIS — I10 ESSENTIAL HYPERTENSION: Primary | ICD-10-CM

## 2021-08-11 ENCOUNTER — TELEPHONE (OUTPATIENT)
Dept: FAMILY MEDICINE CLINIC | Age: 86
End: 2021-08-11

## 2021-08-11 NOTE — TELEPHONE ENCOUNTER
Caller: Tracy Brooks    Relationship: Self    Best call back number: [phone]    What is the best time to reach you: ANYTIME     Who are you requesting to speak with (clinical staff, provider,  specific staff member): CLINICAL STAFF     Do you know the name of the person who called: TRACY     What was the call regarding: PATIENT WANTS TO SPEAK TO DR. ELLSWORTH ABOUT HER PRESCRIPTION. PLEASE CALL AND ADVISE.     Do you require a callback: YES

## 2021-08-12 RX ORDER — LOSARTAN POTASSIUM 50 MG/1
50 TABLET ORAL DAILY
Qty: 90 TABLET | Refills: 1 | Status: SHIPPED | OUTPATIENT
Start: 2021-08-12 | End: 2022-01-21

## 2021-08-12 NOTE — TELEPHONE ENCOUNTER
Caller: [facility] - [place], [place] - [address] [phone] [facility] [phone] FX    Relationship to patient: Pharmacy    Best call back number: [phone]    Patient is needing: BR[facility]NEY FROM [facility] CALLED STATING THAT THEY NEED A REFILL SENT OVER FOR JUST LOSARTAN 50 MG. THEY ARE NEEDING THIS ASAP AND SHE WOULD LIKE A CALL BACK PLEASE ADVISE THANK YOU.         HUB UNABLE TO WARM TRANSFER    
I want her off the HCTZ and to just take plain losartan  
Please clarify looks like the discharge summary from may said losartan 50 with hctz 12.5 but the visits after that just states losartan 50 mg please advise and [facility] to [facility]   
Pt inf rx send by provider   
THE PATIENT HAS CALLED, STATING THAT [facility] HAS YET TO RECEIVE HER PRESCRIPTION REFILL FOR losartan 50 MG tablet 50 mg, hydroCHLOROthiazide 12.5 MG 12.5 mg    SHE IS REQUESTING THAT DR. ELLSWORTH'S NURSE FOLLOW UP WITH HER WHEN THERE IS AN UPDATE.     PATIENT'S CALL BACK: [phone]  
none...

## 2021-08-17 RX ORDER — AMLODIPINE BESYLATE 5 MG/1
TABLET ORAL
Qty: 90 TABLET | Refills: 3 | Status: SHIPPED | OUTPATIENT
Start: 2021-08-17

## 2021-09-07 RX ORDER — LEVOTHYROXINE SODIUM 0.05 MG/1
TABLET ORAL
Qty: 90 TABLET | Refills: 3 | Status: SHIPPED | OUTPATIENT
Start: 2021-09-07

## 2021-11-30 ENCOUNTER — LAB (OUTPATIENT)
Dept: LAB | Facility: HOSPITAL | Age: 86
End: 2021-11-30

## 2021-11-30 ENCOUNTER — OFFICE VISIT (OUTPATIENT)
Dept: FAMILY MEDICINE CLINIC | Age: 86
End: 2021-11-30

## 2021-11-30 ENCOUNTER — HOSPITAL ENCOUNTER (OUTPATIENT)
Dept: GENERAL RADIOLOGY | Facility: HOSPITAL | Age: 86
Discharge: HOME | End: 2021-11-30

## 2021-11-30 VITALS
SYSTOLIC BLOOD PRESSURE: 136 MMHG | WEIGHT: 141.4 LBS | HEART RATE: 87 BPM | BODY MASS INDEX: 25.05 KG/M2 | HEIGHT: 63 IN | TEMPERATURE: 98.5 F | DIASTOLIC BLOOD PRESSURE: 64 MMHG

## 2021-11-30 DIAGNOSIS — E03.9 ACQUIRED HYPOTHYROIDISM: ICD-10-CM

## 2021-11-30 DIAGNOSIS — M54.50 ACUTE BILATERAL LOW BACK PAIN WITHOUT SCIATICA: ICD-10-CM

## 2021-11-30 DIAGNOSIS — Z12.11 COLON CANCER SCREENING: ICD-10-CM

## 2021-11-30 DIAGNOSIS — D50.9 IRON DEFICIENCY ANEMIA, UNSPECIFIED IRON DEFICIENCY ANEMIA TYPE: ICD-10-CM

## 2021-11-30 DIAGNOSIS — E78.00 PURE HYPERCHOLESTEROLEMIA: ICD-10-CM

## 2021-11-30 DIAGNOSIS — H40.9 GLAUCOMA, UNSPECIFIED GLAUCOMA TYPE, UNSPECIFIED LATERALITY: ICD-10-CM

## 2021-11-30 DIAGNOSIS — Z00.00 ENCOUNTER FOR ANNUAL WELLNESS EXAM IN MEDICARE PATIENT: Primary | ICD-10-CM

## 2021-11-30 DIAGNOSIS — I10 ESSENTIAL HYPERTENSION: ICD-10-CM

## 2021-11-30 DIAGNOSIS — Z78.0 POSTMENOPAUSAL STATE: ICD-10-CM

## 2021-11-30 DIAGNOSIS — Z12.31 ENCOUNTER FOR SCREENING MAMMOGRAM FOR BREAST CANCER: ICD-10-CM

## 2021-11-30 LAB
BILIRUB UR QL STRIP: NEGATIVE
CLARITY UR: CLEAR
COLOR UR: YELLOW
DEPRECATED RDW RBC AUTO: 47.9 FL (ref 37–54)
ERYTHROCYTE [DISTWIDTH] IN BLOOD BY AUTOMATED COUNT: 13.1 % (ref 12.3–15.4)
GLUCOSE UR STRIP-MCNC: NEGATIVE MG/DL
HCT VFR BLD AUTO: 40.2 % (ref 34–46.6)
HGB BLD-MCNC: 13.1 G/DL (ref 12–15.9)
HGB UR QL STRIP.AUTO: NEGATIVE
KETONES UR QL STRIP: NEGATIVE
LEUKOCYTE ESTERASE UR QL STRIP.AUTO: NEGATIVE
MCH RBC QN AUTO: 32 PG (ref 26.6–33)
MCHC RBC AUTO-ENTMCNC: 32.6 G/DL (ref 31.5–35.7)
MCV RBC AUTO: 98.3 FL (ref 79–97)
NITRITE UR QL STRIP: NEGATIVE
PH UR STRIP.AUTO: 7 [PH] (ref 5–8)
PLATELET # BLD AUTO: 192 10*3/MM3 (ref 140–450)
PMV BLD AUTO: 9.1 FL (ref 6–12)
PROT UR QL STRIP: NEGATIVE
RBC # BLD AUTO: 4.09 10*6/MM3 (ref 3.77–5.28)
SP GR UR STRIP: 1.02 (ref 1–1.03)
UROBILINOGEN UR QL STRIP: NORMAL
WBC NRBC COR # BLD: 5.81 10*3/MM3 (ref 3.4–10.8)

## 2021-11-30 PROCEDURE — 1159F MED LIST DOCD IN RCRD: CPT | Performed by: FAMILY MEDICINE

## 2021-11-30 PROCEDURE — 72100 X-RAY EXAM L-S SPINE 2/3 VWS: CPT

## 2021-11-30 PROCEDURE — 80053 COMPREHEN METABOLIC PANEL: CPT

## 2021-11-30 PROCEDURE — 1170F FXNL STATUS ASSESSED: CPT | Performed by: FAMILY MEDICINE

## 2021-11-30 PROCEDURE — 85027 COMPLETE CBC AUTOMATED: CPT

## 2021-11-30 PROCEDURE — 84439 ASSAY OF FREE THYROXINE: CPT

## 2021-11-30 PROCEDURE — 84466 ASSAY OF TRANSFERRIN: CPT

## 2021-11-30 PROCEDURE — 84443 ASSAY THYROID STIM HORMONE: CPT

## 2021-11-30 PROCEDURE — 81003 URINALYSIS AUTO W/O SCOPE: CPT

## 2021-11-30 PROCEDURE — 36415 COLL VENOUS BLD VENIPUNCTURE: CPT

## 2021-11-30 PROCEDURE — G0439 PPPS, SUBSEQ VISIT: HCPCS | Performed by: FAMILY MEDICINE

## 2021-11-30 PROCEDURE — 83540 ASSAY OF IRON: CPT

## 2021-11-30 PROCEDURE — 99214 OFFICE O/P EST MOD 30 MIN: CPT | Performed by: FAMILY MEDICINE

## 2021-11-30 NOTE — PROGRESS NOTES
The ABCs of the Annual Wellness Visit  Subsequent Medicare Wellness Visit    Chief Complaint   Patient presents with   • Back Pain     ongoing for 2 weeks      Subjective    History of Present Illness:  Tracy Brooks is a 86 y.o. female who presents for a Subsequent Medicare Wellness Visit.    The following portions of the patient's history were reviewed and   updated as appropriate: allergies, current medications, past family history, past medical history, past social history, past surgical history and problem list.    Compared to one year ago, the patient feels her physical   health is the same.    Compared to one year ago, the patient feels her mental   health is the same.    Recent Hospitalizations:  She was admitted within the past 365 days at [facility].     HPI  Tracy is in today with acute worsening of her back pain, she was in [facility] May and was d/c home with a hospital bed and this bed is very uncomfortable.  Her back pain onset 2 weeks ago, pain with bending over, she went to a chiropracter and he referred her to me. Pain does not run down her legs.  She is urinating more.         She has emphysema and she is off all meds and doing well, no SOA, lungs are clear.           Additionally, she presents with history of pure hypercholesterolemia, unspecified.  current treatment a low cholesterol/low fat diet.  Compliance with treatment has been good; she takes her medication as directed.  She denies experiencing any hypercholesterolemia related symptoms.  Most recent lab tests include Access to Hyperlipidemia Flowsheet.       No issues with iron, no constipation          Additionally, she presents with history of essential (primary) hypertension.  current nonpharmacologic treatment includes low sodium diet.  Her current cardiac medication regimen includes a calcium channel blocker, and an angiotensin receptor blocker.  Compliance with treatment has been good; she takes her medication as  directed.  She is tolerating the medication well without side effects.  She has not kept a blood pressure diary, but states that pressures have been well controlled.            Additionally, she presents with history of other specified hypothyroidism.  this was first diagnosed more than 6 months ago.  She is currently taking Levothyroid, 50 mcg daily.  TSH was last checked two months ago.  The result was reported as normal.  She denies any related symptoms.  She reports no symptoms suggestive of adverse medication effect.      PAST MEDICAL HISTORY         Hypertension     Gastroesophageal Reflux Disease     Osteoporosis: takes fortaeo;     has three thyroid nodules     Breast cancer: dx'd in Right with mastectomy 2012, Left with mastectomy 2/2013;     eyes-macular degeneration eyes b, glaucoma L eye     Hip pain     Scalp laceration     Emphysema, other     Use of high risk medications     Unspecified fall     Dizziness     Arm pain     Osteoporosis     Acquired hypothyroidism     Generalized osteoarthritis     History of breast cancer     Osteopenia     Family history of colon cancer      Essential hypercholesterolemia     chronic back pain    GERD     Thyroid nodule     Knee pain     Shoulder pain     Multiple thoracic compression fractures         ADVANCE DIRECTIVES: Living will on file, signed 4-13-16, ./pr         PREVENTIVE HEALTH MAINTENANCE     BONE DENSITY: was last done 4/2021 with the following abnormality noted-- osteoporosis - declines Prolia     COLORECTAL CANCER SCREENING: Up to date (colonoscopy q10y; sigmoidoscopy q5y; Cologuard q3y) was last done 3/20/18, Results are in chart; colonoscopy with the following abnormalities noted-- diverticulosis -DONE    EYE EXAM: was last done August 2021 macular degeneration and glaucoma     MAMMOGRAM: was last done 2013 with normal results s/p double mastectomy     PAP SMEAR: was last done 2016 with normal results , done due to age           Current Medical  Providers:  Patient Care Team:  Cathy Lebron MD as PCP - General (Family Medicine)  Rashawn Jackson MD (Hematology and Oncology)  Francesco Villarreal MD as Consulting Physician (Ophthalmology)    Outpatient Medications Prior to Visit   Medication Sig Dispense Refill   • amLODIPine (NORVASC) 5 MG tablet TAKE 1 TABLET DAILY 90 tablet 3   • aspirin 81 MG chewable tablet Chew 81 mg Daily.     • levothyroxine (SYNTHROID, LEVOTHROID) 50 MCG tablet TAKE 1 TABLET DAILY 90 tablet 3   • losartan (Cozaar) 50 MG tablet Take 1 tablet by mouth Daily. 90 tablet 1   • Polyethyl Glyc-Propyl Glyc PF (SYSTANE PF) 0.4-0.3 % solution ophthalmic solution Administer 1 drop to both eyes Daily.     • TIMOLOL MALEATE OP Apply  to eye(s) as directed by provider.     • Cholecalciferol (Vitamin D) 50 MCG (2000 UT) capsule Take 1 capsule by mouth Daily.     • multivitamin with minerals tablet tablet Take 1 tablet by mouth Daily.     • alendronate (FOSAMAX) 70 MG tablet Take 70 mg by mouth Every 7 (Seven) Days.     • CALCIUM-VITAMIN D PO Take  by mouth.     • iron polysaccharides (NIFEREX) 150 MG capsule Take 150 mg by mouth 2 (Two) Times a Day.     • tiotropium bromide-olodaterol (STIOLTO RESPIMAT) 2.5-2.5 MCG/ACT aerosol solution inhaler Inhale 2 puffs Daily.       No facility-administered medications prior to visit.       No opioid medication identified on active medication list. I have reviewed chart for other potential  high risk medication/s and harmful drug interactions in the elderly.          Aspirin is on active medication list. Aspirin use is indicated based on review of current medical condition/s. Pros and cons of this therapy have been discussed today. Benefits of this medication outweigh potential harm.  Patient has been encouraged to continue taking this medication.  .      Patient Active Problem List   Diagnosis   • Encounter for annual wellness exam in Medicare patient     Advance Care Planning  Advance Directive is  "not on file.  ACP discussion was held with the patient during this visit. Patient does not have an advance directive, information provided.    Review of Systems   Constitutional: Positive for fatigue. Negative for chills and fever.   HENT: Negative for ear pain, sinus pressure and sore throat.    Respiratory: Negative for cough, shortness of breath and wheezing.    Cardiovascular: Negative for chest pain, palpitations and leg swelling.   Gastrointestinal: Negative for abdominal pain, blood in stool, constipation, diarrhea, nausea and vomiting.   Genitourinary: Positive for frequency. Negative for dysuria.   Musculoskeletal: Positive for arthralgias.   Skin: Negative for rash.   Neurological: Negative for dizziness.   Psychiatric/Behavioral: Negative for sleep disturbance and suicidal ideas.        Objective    Vitals:    11/30/21 1137   BP: 136/64   BP Location: Left arm   Patient Position: Sitting   Cuff Size: Adult   Pulse: 87   Temp: 98.5 °F (36.9 °C)   TempSrc: Oral   Weight: 64.1 kg (141 lb 6.4 oz)   Height: 158.8 cm (62.5\")     BMI Readings from Last 1 Encounters:   11/30/21 25.45 kg/m²   BMI is above normal parameters. Recommendations include:BMI is good  Does the patient have evidence of cognitive impairment? No    Physical Exam  Vitals and nursing note reviewed.   Constitutional:       General: She is not in acute distress.     Appearance: Normal appearance. She is not ill-appearing, toxic-appearing or diaphoretic.   HENT:      Head: Normocephalic and atraumatic.      Right Ear: Tympanic membrane, ear canal and external ear normal.      Left Ear: Tympanic membrane, ear canal and external ear normal.      Nose: No congestion or rhinorrhea.      Mouth/Throat:      Pharynx: Oropharynx is clear. No oropharyngeal exudate or posterior oropharyngeal erythema.   Eyes:      Extraocular Movements: Extraocular movements intact.      Conjunctiva/sclera: Conjunctivae normal.   Cardiovascular:      Rate and Rhythm: " Normal rate and regular rhythm.      Heart sounds: Normal heart sounds.   Pulmonary:      Breath sounds: Normal breath sounds. No wheezing, rhonchi or rales.   Abdominal:      General: Abdomen is flat.      Palpations: Abdomen is soft.   Musculoskeletal:      Cervical back: Neck supple. No rigidity.   Lymphadenopathy:      Cervical: No cervical adenopathy.   Skin:     General: Skin is warm and dry.   Neurological:      Mental Status: She is alert and oriented to person, place, and time.   Psychiatric:         Mood and Affect: Mood normal.         Behavior: Behavior normal.                 HEALTH RISK ASSESSMENT    Smoking Status:  Social History     Tobacco Use   Smoking Status Never Smoker   Smokeless Tobacco Never Used     Alcohol Consumption:  Social History     Substance and Sexual Activity   Alcohol Use Not Currently     Fall Risk Screen:    [facility] Fall Risk Assessment was completed, and patient is at MODERATE risk for falls. Assessment completed on:11/30/2021    Depression Screening:  PHQ-2/PHQ-9 Depression Screening 11/30/2021   Little interest or pleasure in doing things 0   Feeling down, depressed, or hopeless 0   Trouble falling or staying asleep, or sleeping too much 0   Feeling tired or having little energy 2   Poor appetite or overeating 0   Feeling bad about yourself - or that you are a failure or have let yourself or your family down 0   Trouble concentrating on things, such as reading the newspaper or watching television 0   Moving or speaking so slowly that other people could have noticed. Or the opposite - being so fidgety or restless that you have been moving around a lot more than usual 0   Thoughts that you would be better off dead, or of hurting yourself in some way 0   Total Score 2   If you checked off any problems, how difficult have these problems made it for you to do your work, take care of things at home, or get along with other people? Not difficult at all       Health Habits and  Functional and Cognitive Screening:  Functional & Cognitive Status 11/30/2021   Do you have difficulty preparing food and eating? No   Do you have difficulty bathing yourself, getting dressed or grooming yourself? No   Do you have difficulty using the toilet? No   Do you have difficulty moving around from place to place? No   Do you have trouble with steps or getting out of a bed or a chair? No   Current Diet Well Balanced Diet   Dental Exam Up to date   Eye Exam Up to date   Exercise (times per week) 0 times per week   Do you need help using the phone?  No   Are you deaf or do you have serious difficulty hearing?  Yes   Do you need help with transportation? Yes   Do you need help shopping? Yes   Do you need help preparing meals?  No   Do you need help with housework?  No   Do you need help with laundry? No   Do you need help taking your medications? No   Do you need help managing money? No   Do you ever drive or ride in a car without wearing a seat belt? No   Have you felt unusual stress, anger or loneliness in the last month? No   Who do you live with? Alone   If you need help, do you have trouble finding someone available to you? No   Have you been bothered in the last four weeks by sexual problems? No   Do you have difficulty concentrating, remembering or making decisions? No       Age-appropriate Screening Schedule:  Refer to the list below for future screening recommendations based on patient's age, sex and/or medical conditions. Orders for these recommended tests are listed in the plan section. The patient has been provided with a written plan.    Health Maintenance   Topic Date Due   • INFLUENZA VACCINE  11/30/2022 (Originally 8/1/2021)   • TDAP/TD VACCINES (1 - Tdap) 11/30/2022 (Originally 11/24/1954)   • ZOSTER VACCINE (1 of 2) 11/30/2022 (Originally 11/24/1985)   • DXA SCAN  04/20/2023   • LIPID PANEL  Discontinued              Assessment/Plan   CMS Preventative Services Quick Reference  Risk Factors  Identified During Encounter  Glaucoma or Family History of Glaucoma  Hearing Problem  Immunizations Discussed/Encouraged (specific Immunizations; Tdap, Influenza, Shingrix and COVID19  Inactivity/Sedentary  The above risks/problems have been discussed with the patient.  Follow up actions/plans if indicated are seen below in the Assessment/Plan Section.  Pertinent information has been shared with the patient in the After Visit Summary.    Diagnoses and all orders for this visit:    1. Encounter for annual wellness exam in Medicare patient (Primary)  Assessment & Plan:  MEDICARE WELLNESS EXAM-SHE IS DONE WITH MAMMOGRAM (double mastectomy), DONE WITH PAP/PELVIC, UTD ON  DEXA, UTD ON COLONOSCOPY DONE 2018 AND IS DONE WITH THESE, DEFERS FLU/TDAP/SHINGLES, PNUEMONIA AND PREVNAR ARE UTD, LONG STANDING FALL RISK-SHE USES A WALKER AND A CANE, MILD MEMORY ISSUES IS STABLE, NO DEPRESSION, SHE LIVES ALONE BUT HAS GREAT FAMILY SUPPORT-CAMERA SYSTEM IN HOME FOR CLOSE MONITORING, SHE IS NO LONGER DRIVING, ABLE TO  PERFORM ADL'S AND FINANCES, SHE NOW HAS A  LIVING WILL,  MD LIST UPDATED WEIGHT IS GOOD      2. Acute bilateral low back pain without sciatica  -     Urinalysis With Culture If Indicated -; Future  -     XR Spine Lumbar 2 or 3 View; Future    3. Essential hypertension  -     Comprehensive Metabolic Panel; Future  -     CBC (No Diff); Future    4. Pure hypercholesterolemia    5. Acquired hypothyroidism  -     TSH+Free T4; Future    6. Glaucoma, unspecified glaucoma type, unspecified laterality    7. Iron deficiency anemia, unspecified iron deficiency anemia type  -     Iron Profile; Future    8. Encounter for screening mammogram for breast cancer    9. Postmenopausal state    10. Colon cancer screening      Follow Up:   Return in about 3 months (around 2/28/2022) for Recheck.     An After Visit Summary and PPPS were made available to the patient.

## 2021-11-30 NOTE — ASSESSMENT & PLAN NOTE
MEDICARE WELLNESS EXAM-SHE IS DONE WITH MAMMOGRAM (double mastectomy), DONE WITH PAP/PELVIC, UTD ON  DEXA, UTD ON COLONOSCOPY DONE 2018 AND IS DONE WITH THESE, DEFERS FLU/TDAP/SHINGLES, PNUEMONIA AND PREVNAR ARE UTD, LONG STANDING FALL RISK-SHE USES A WALKER AND A CANE, MILD MEMORY ISSUES IS STABLE, NO DEPRESSION, SHE LIVES ALONE BUT HAS GREAT FAMILY SUPPORT-CAMERA SYSTEM IN HOME FOR CLOSE MONITORING, SHE IS NO LONGER DRIVING, ABLE TO  PERFORM ADL'S AND FINANCES, SHE NOW HAS A  LIVING WILL,  MD LIST UPDATED WEIGHT IS GOOD

## 2021-12-01 ENCOUNTER — TELEPHONE (OUTPATIENT)
Dept: FAMILY MEDICINE CLINIC | Age: 86
End: 2021-12-01

## 2021-12-01 LAB
ALBUMIN SERPL-MCNC: 4.4 G/DL (ref 3.5–5.2)
ALBUMIN/GLOB SERPL: 1.4 G/DL
ALP SERPL-CCNC: 125 U/L (ref 39–117)
ALT SERPL W P-5'-P-CCNC: 21 U/L (ref 1–33)
ANION GAP SERPL CALCULATED.3IONS-SCNC: 11.4 MMOL/L (ref 5–15)
AST SERPL-CCNC: 24 U/L (ref 1–32)
BILIRUB SERPL-MCNC: 0.4 MG/DL (ref 0–1.2)
BUN SERPL-MCNC: 21 MG/DL (ref 8–23)
BUN/CREAT SERPL: 22.6 (ref 7–25)
CALCIUM SPEC-SCNC: 10.7 MG/DL (ref 8.6–10.5)
CHLORIDE SERPL-SCNC: 103 MMOL/L (ref 98–107)
CO2 SERPL-SCNC: 24.6 MMOL/L (ref 22–29)
CREAT SERPL-MCNC: 0.93 MG/DL (ref 0.57–1)
GFR SERPL CREATININE-BSD FRML MDRD: 57 ML/MIN/1.73
GLOBULIN UR ELPH-MCNC: 3.2 GM/DL
GLUCOSE SERPL-MCNC: 88 MG/DL (ref 65–99)
IRON 24H UR-MRATE: 56 MCG/DL (ref 37–145)
IRON SATN MFR SERPL: 19 % (ref 20–50)
POTASSIUM SERPL-SCNC: 4.2 MMOL/L (ref 3.5–5.2)
PROT SERPL-MCNC: 7.6 G/DL (ref 6–8.5)
SODIUM SERPL-SCNC: 139 MMOL/L (ref 136–145)
T4 FREE SERPL-MCNC: 1.44 NG/DL (ref 0.93–1.7)
TIBC SERPL-MCNC: 297 MCG/DL (ref 298–536)
TRANSFERRIN SERPL-MCNC: 199 MG/DL (ref 200–360)
TSH SERPL DL<=0.05 MIU/L-ACNC: 2 UIU/ML (ref 0.27–4.2)

## 2021-12-01 NOTE — TELEPHONE ENCOUNTER
Pt 's daughter is calling and she wants to know labs ,urine and xray results from yesterday [phone]

## 2021-12-01 NOTE — TELEPHONE ENCOUNTER
Hub staff attempted to follow warm transfer process and was unsuccessful     Caller: Tracy Brooks    Relationship to patient: Self    Best call back number: [phone]     Patient is needing: PATIENT IS CALLING WANTING LAB RESULTS PERFORMED 11-30. PATIENT STATES SHE IS IN PAIN AND WOULD LIKE TO KNOW WHATS GOING ON AND IF WE CAN START HER IN SOME MEDICATION.     PLEASE CALL BACK AND ADVISE

## 2021-12-06 DIAGNOSIS — M54.50 ACUTE BILATERAL LOW BACK PAIN WITHOUT SCIATICA: Primary | ICD-10-CM

## 2021-12-06 RX ORDER — METHYLPREDNISOLONE 4 MG/1
TABLET ORAL
Qty: 21 TABLET | Refills: 0 | Status: SHIPPED | OUTPATIENT
Start: 2021-12-06 | End: 2021-12-21

## 2021-12-06 NOTE — TELEPHONE ENCOUNTER
Inform Tracy her labs were overall okay and stable and showed no findings for why she is having worsening back pain.  Her x-ray also looks okay and just shows chronic arthritis and the disc height are good which makes it unlikely she has a herniated disc in her back.  I am going to set her up with physical therapy and start her on a steroid Dosepak and see if this can help improve her back.  I recommend heat to the back 3-4 times a day.  Dr. Lebron

## 2021-12-07 ENCOUNTER — TELEPHONE (OUTPATIENT)
Dept: FAMILY MEDICINE CLINIC | Age: 86
End: 2021-12-07

## 2021-12-07 NOTE — TELEPHONE ENCOUNTER
Caller: STONEY JOHNSTON     Relationship: DAUGHTER     Best call back number: [phone]    What is the best time to reach you: ANYTIME     Who are you requesting to speak with (clinical staff, provider,  specific staff member): CLINICAL       What was the call regarding: DAUGHTER WAS WANTING TO KNOW THE RESULTS OF PATIENT'S TEST RESULTS INCLUDING LABS, X-RAY, AND URINE SAMPLE. CALLER IS UPSET THAT NO ONE HAS CONTACTED THE PATIENT TO INFORM HER OF THE RESULTS.     Do you require a callback: YES         STONEY JOHNSTON IS NOT ON THE  VERBAL.

## 2021-12-07 NOTE — TELEPHONE ENCOUNTER
PT'S DAUGHTER CALLED TO SAY THE PRACTICE CALLED HER MOM AND INFORMED HER, SO NO OTHER ACTION IS NEEDED ON THIS, THANK YOU.

## 2021-12-18 ENCOUNTER — TELEPHONE (OUTPATIENT)
Dept: FAMILY MEDICINE CLINIC | Age: 86
End: 2021-12-18

## 2021-12-18 NOTE — TELEPHONE ENCOUNTER
Spoke to patient's daughter, Madelyn.  Patient is having more issues with back pain.  Is out of her steroid medication.  Has not been taking any over-the-counter pain medications.  I recommend she get on Tylenol 500 mg 4 times daily scheduled.  Additionally can take Advil 200 mg with food on an as needed basis every 6 hours if needed.  Hopefully this will get her through the weekend and then she can discuss with Dr. Lebron  options to address her pain.    celine

## 2021-12-20 NOTE — TELEPHONE ENCOUNTER
Patient is a personal friend and reached out to me as well last evening about whether she needed a refill on medication.  I will forward this to Dr. Lebron and her team for guidance.  Thanks.

## 2021-12-21 ENCOUNTER — TELEPHONE (OUTPATIENT)
Dept: FAMILY MEDICINE CLINIC | Age: 86
End: 2021-12-21

## 2021-12-21 DIAGNOSIS — M54.50 ACUTE BILATERAL LOW BACK PAIN WITHOUT SCIATICA: Primary | ICD-10-CM

## 2021-12-21 RX ORDER — METHOCARBAMOL 500 MG/1
500 TABLET, FILM COATED ORAL 4 TIMES DAILY
Qty: 40 TABLET | Refills: 1 | Status: SHIPPED | OUTPATIENT
Start: 2021-12-21 | End: 2021-12-21

## 2021-12-21 RX ORDER — PREDNISONE 20 MG/1
TABLET ORAL
Qty: 15 TABLET | Refills: 0 | Status: SHIPPED | OUTPATIENT
Start: 2021-12-21

## 2021-12-21 RX ORDER — METHOCARBAMOL 500 MG/1
500 TABLET, FILM COATED ORAL 4 TIMES DAILY
Qty: 40 TABLET | Refills: 1 | Status: SHIPPED | OUTPATIENT
Start: 2021-12-21

## 2021-12-21 NOTE — TELEPHONE ENCOUNTER
Caller: STONEY    Relationship: Child    Best call back number: [phone]    What medication are you requesting: STEROID    What are your current symptoms: BACK AND HIP PAIN    How long have you been experiencing symptoms: 3 WEEKS    Have you had these symptoms before:    [x] Yes  [] No    Have you been treated for these symptoms before:   [x] Yes  [] No    If a prescription is needed, what is your preferred pharmacy and phone number: [facility] [address] [phone] [facility] [phone] [facility]     Additional notes:

## 2021-12-21 NOTE — TELEPHONE ENCOUNTER
Diet: Diabetes  Food is an important tool that you can use to control diabetes and stay healthy. Eating well-balanced meals in the correct amounts will help you control your blood glucose levels and prevent low blood sugar reactions. It will also help you reduce the health risks of diabetes. There is no one specific diet that is right for everyone with diabetes. But there are general guidelines to follow. A registered dietitian (RD) will create a tailored diet approach thats just right for you. He or she will also help you plan healthy meals and snacks. If you have any questions, call your dietitian for advice.     Guidelines for success  Talk with your healthcare provider before starting a diabetes diet or weight loss program. If you haven't talked with a dietitian yet, ask your provider for a referral. The following guidelines can help you succeed:  · Select foods from the 6 food groups below. Your dietitian will help you find food choices within each group. He or she will also show you serving sizes and how many servings you can have at each meal.  ¨ Grains, beans, and starchy vegetables  ¨ Vegetables  ¨ Fruit  ¨ Milk or yogurt  ¨ Meat, poultry, fish, or tofu  ¨ Healthy fats  · Check your blood sugar levels as directed by your provider. Take any medicine as prescribed by your provider.  · Learn to read food labels and pick the right portion sizes.  · Eat only the amount of food in your meal plan. Eat about the same amount of food at regular times each day. Dont skip meals. Eat meals 4 to 5 hours apart, with snacks in between.  · Limit alcohol. It raises blood sugar levels. Drink water or calorie-free diet drinks that use safe sweeteners.  · Eat less fat to help lower your risk of heart disease. Use nonfat or low-fat dairy products and lean meats. Avoid fried foods. Use cooking oils that are unsaturated, such as olive, canola, or peanut oil.  · Talk with your dietitian about safe sugar substitutes.  · Avoid  Pt is doing tylenol qid but she has not been doing advil so she will add that every 6 hours prn please advise if there is anything else she can do    added salt. It can contribute to high blood pressure, which can cause heart disease. People with diabetes already have a risk of high blood pressure and heart disease.  · Stay at a healthy weight. If you need to lose weight, cut down on your portion sizes. But dont skip meals. Exercise is an important part of any weight management program. Talk with your provider about an exercise program thats right for you.  · For more information about the best diet plan for you, talk with a registered dietitian (RD). To find an RD in your area, contact:  ¨ Academy of Nutrition and Dietetics www.eatright.org  ¨ The American Diabetes Association 502-695-4121 www.diabetes.org  Date Last Reviewed: 8/1/2016 © 2000-2017 [employer]. [address] [place] [address], [place] [address]. All rights reserved. This information is not intended as a substitute for professional medical care. Always follow your healthcare professional's instructions.        Before You Start Your Diabetes Exercise Plan  Fitness plays a special role for people who have diabetes. Being fit means becoming healthier by adding activity to your day. Talk to your healthcare provider before getting started. He or she may want you to have a checkup before you become more active. Also, having certain tests first helps you and your healthcare provider learn how you will respond to a fitness program.    Your checkup  A medical checkup helps ensure that your fitness plan will bring you the most benefit. It also keeps at a minimum the risks that may come with physical activity. As part of your checkup:  · You may have a test called a hemoglobin A1C. The A1C test measures your average glucose (sugar) level over 2 to 3 months. A1C is usually given as a percentage. But it is now also given as a number representing estimated Average Glucose (eAG). Unlike the A1C percentage, eAG gives you a number similar to the numbers listed on your daily glucose monitor.  · Your  healthcare provider may check the health of your heart with a resting electrocardiogram. Diabetes can cause heart problems. But a fitness program can help these problems get better. Being fit can also help improve your cholesterol and blood pressure levels.  · Your healthcare provider may check the health of your feet. People who have diabetes can have problems with their feet. Your healthcare provider can check your feet before you become more active. Take time to find shoes that will support your feet well while you exercise.   If you have an exercise stress test  An exercise stress test can show how your heart responds to activity and what level of exercise is right for you. You will have small electrodes placed on your chest. You will then walk on a treadmill or ride an exercise bike while your heart rate is monitored. If you have this test, your healthcare provider will give you more details.     Date Last Reviewed: 7/1/2016 © 2000-2017 [employer]. [address] [place] [address]. All rights reserved. This information is not intended as a substitute for professional medical care. Always follow your healthcare professional's instructions.        Understanding Carbohydrates, Fats, and Protein  Food is a source of fuel and nourishment for your body. Its also a source of pleasure. Having diabetes doesnt mean you have to eat special foods or give up desserts. Instead, your dietitian can show you how to plan meals to suit your body. To start, learn how different foods affect blood sugar.  Carbohydrates  Carbohydrates are the main source of fuel for the body. Carbohydrates raise blood sugar. Many people think carbohydrates are only found in pasta or bread. But carbohydrates are actually in many kinds of foods:  · Sugars occur naturally in foods such as fruit, milk, honey, and molasses. Sugars can also be added to many foods, from cereals and yogurt to candy and desserts. Sugars raise blood  sugar.  · Starches are found in bread, cereals, pasta, and dried beans. Theyre also found in corn, peas, potatoes, yam, acorn squash, and butternut squash. Starches also raise blood sugar.   · Fiber is found in foods such as vegetables, fruits, beans, and whole grains. Unlike other carbs, fiber isnt digested or absorbed. So it doesnt raise blood sugar. In fact, fiber can help keep blood sugar from rising too fast. It also helps keep blood cholesterol at a healthy level.  Did you know?  Even though carbohydrates raise blood sugar, its best to have some in every meal. They are an important part of a healthy diet.   Fat  Fat is an energy source that can be stored until needed. Fat does not raise blood sugar. However, it can raise blood cholesterol, increasing the risk of heart disease. Fat is also high in calories, which can cause weight gain. Not all types of fat are the same.  More Healthy:  · Monounsaturated fats are mostly found in vegetable oils, such as olive, canola, and peanut oils. They are also found in avocados and some nuts. Monounsaturated fats are healthy for your heart. Thats because they lower LDL (unhealthy) cholesterol.  · Polyunsaturated fats are mostly found in vegetable oils, such as corn, safflower, and soybean oils. They are also found in some seeds, nuts, and fish. Polyunsaturated fats lower LDL (unhealthy) cholesterol. So, choosing them instead of saturated fats is healthy for your heart. Certain unsaturated fats can help lower triglycerides.   Less Healthy:  · Saturated fats are found in animal products, such as meat, poultry, whole milk, lard, and butter. Saturated fats raise LDL cholesterol and are not healthy for your heart.  · Hydrogenated oils and trans fats are formed when vegetable oils are processed into solid fats. They are found in many processed foods. Hydrogenated oils and trans fats raise LDL cholesterol and lower HDL (healthy) cholesterol. They are not healthy for your  heart.  Protein  Protein helps the body build and repair muscle and other tissue. Protein has little or no effect on blood sugar. However, many foods that contain protein also contain saturated fat. By choosing low-fat protein sources, you can get the benefits of protein without the extra fat:  · Plant protein is found in dry beans and peas, nuts, and soy products, such as tofu and soymilk. These sources tend to be cholesterol-free and low in saturated fat.  · Animal protein is found in fish, poultry, meat, cheese, milk, and eggs. These contain cholesterol and can be high in saturated fat. Aim for lean, lower-fat choices.  Date Last Reviewed: 3/1/2016  © 9913-2586 [employer]. [address] [place] [address], [place] [address]. All rights reserved. This information is not intended as a substitute for professional medical care. Always follow your healthcare professional's instructions.

## 2021-12-21 NOTE — TELEPHONE ENCOUNTER
Caller: ArtTracy weiss    Relationship: Self    Best call back number: [phone]    What medication are you requesting: PAIN MEDICATION    What are your current symptoms: LOWER BACK PAIN    How long have you been experiencing symptoms: LONG TIME    Have you had these symptoms before:    [x] Yes  [] No    Have you been treated for these symptoms before:   [x] Yes  [] No    If a prescription is needed, what is your preferred pharmacy and phone number: [facility] [place], [place] - [address]  - [phone]  - [phone] FX     Additional notes:  THE PATIENT STATED SHE IS HAVING LOWER BACK PAIN ESPECIALLY GETTING IN AND OUT OF BED. SHE WOULD LIKE TO KNOW IF PCP CAN SEND IN A PRESCRIPTION FOR HER BEFORE [facility]. THE PATIENT WOULD LIKE A CALL BACK TO DISCUSS WITH NURSE ASAP

## 2021-12-21 NOTE — TELEPHONE ENCOUNTER
Her urine was negative and her x-ray showed chronic degenerative changes.  Find out how she is doing with her pain taking Tylenol and ibuprofen and I want to set her up with physical therapy.  Dr. Lebron

## 2021-12-21 NOTE — TELEPHONE ENCOUNTER
Continue Tylenol 500 mg 4 times daily.  I will add a muscle relaxant Robaxin and repeat her steroid treatment to see if this will help.  I will also get her set up with physical therapy.  Dr. Lebron

## 2021-12-21 NOTE — TELEPHONE ENCOUNTER
Per DR fuentes she will repeat steroid and wants her to apply heat and she will also send her a muscle relaxer . No advil while on the steriods tylenol only and wants her to do PT daughter and pt inf

## 2022-01-08 ENCOUNTER — APPOINTMENT (OUTPATIENT)
Dept: GENERAL RADIOLOGY | Facility: HOSPITAL | Age: 87
End: 2022-01-08

## 2022-01-08 ENCOUNTER — HOSPITAL ENCOUNTER (EMERGENCY)
Facility: HOSPITAL | Age: 87
Discharge: HOME | End: 2022-01-08
Attending: EMERGENCY MEDICINE | Admitting: EMERGENCY MEDICINE

## 2022-01-08 VITALS
WEIGHT: 138.45 LBS | SYSTOLIC BLOOD PRESSURE: 168 MMHG | HEIGHT: 66 IN | HEART RATE: 99 BPM | RESPIRATION RATE: 24 BRPM | BODY MASS INDEX: 22.25 KG/M2 | TEMPERATURE: 98.4 F | DIASTOLIC BLOOD PRESSURE: 83 MMHG | OXYGEN SATURATION: 97 %

## 2022-01-08 DIAGNOSIS — M25.551 RIGHT HIP PAIN: Primary | ICD-10-CM

## 2022-01-08 DIAGNOSIS — R35.0 URINARY FREQUENCY: ICD-10-CM

## 2022-01-08 LAB
BILIRUB UR QL STRIP: NEGATIVE
CLARITY UR: CLEAR
COLOR UR: YELLOW
GLUCOSE UR STRIP-MCNC: NEGATIVE MG/DL
HGB UR QL STRIP.AUTO: NEGATIVE
KETONES UR QL STRIP: ABNORMAL
LEUKOCYTE ESTERASE UR QL STRIP.AUTO: NEGATIVE
NITRITE UR QL STRIP: NEGATIVE
PH UR STRIP.AUTO: 6 [PH] (ref 5–8)
PROT UR QL STRIP: NEGATIVE
SP GR UR STRIP: 1.02 (ref 1–1.03)
UROBILINOGEN UR QL STRIP: ABNORMAL

## 2022-01-08 PROCEDURE — 99283 EMERGENCY DEPT VISIT LOW MDM: CPT

## 2022-01-08 PROCEDURE — 25010000002 HYDROMORPHONE 1 MG/ML SOLUTION: Performed by: EMERGENCY MEDICINE

## 2022-01-08 PROCEDURE — 96372 THER/PROPH/DIAG INJ SC/IM: CPT

## 2022-01-08 PROCEDURE — 73502 X-RAY EXAM HIP UNI 2-3 VIEWS: CPT

## 2022-01-08 PROCEDURE — 81003 URINALYSIS AUTO W/O SCOPE: CPT | Performed by: EMERGENCY MEDICINE

## 2022-01-08 RX ORDER — PREDNISONE 20 MG/1
20 TABLET ORAL 2 TIMES DAILY
Qty: 10 TABLET | Refills: 0 | Status: SHIPPED | OUTPATIENT
Start: 2022-01-08

## 2022-01-08 RX ORDER — HYDROCODONE BITARTRATE AND ACETAMINOPHEN 5; 325 MG/1; MG/1
1 TABLET ORAL EVERY 6 HOURS PRN
Qty: 12 TABLET | Refills: 0 | Status: SHIPPED | OUTPATIENT
Start: 2022-01-08 | End: 2022-01-11 | Stop reason: SDUPTHER

## 2022-01-08 RX ADMIN — HYDROMORPHONE HYDROCHLORIDE 0.5 MG: 1 INJECTION, SOLUTION INTRAMUSCULAR; INTRAVENOUS; SUBCUTANEOUS at 21:57

## 2022-01-08 NOTE — ED PROVIDER NOTES
Time: 6:34 PM EST  Arrived by: ambulance  Chief Complaint: right hip pain  History provided by: patient  History is limited by: N/A     History of Present Illness:  Patient is a 86 y.o. year old female that presents to the emergency department with right hip pain. Denies injury.    HPI    Similar Symptoms Previously: yes  Recently seen: no      Patient Care Team  Primary Care Provider: Cathy Lebron MD    Past Medical History:     Allergies   Allergen Reactions   • Codeine Provider Review Needed   • Azithromycin Rash   • Clindamycin Rash   • Penicillins Rash   • Sulfamethoxazole-Trimethoprim Rash     Past Medical History:   Diagnosis Date   • Cancer (HCC)    • Compression fracture of T8 vertebra (HCC)    • Disease of thyroid gland    • Hyperlipidemia    • Hypertension    • Macular degeneration      Past Surgical History:   Procedure Laterality Date   • CAROTID STENT  2007   • COLON RESECTION  1999   • MASTECTOMY  2012/2013     Family History   Problem Relation Age of Onset   • Colon cancer Mother    • Stroke Father    • Hyperlipidemia Sister    • Hyperlipidemia Brother    • Ovarian cancer Maternal Grandmother        Home Medications:  Prior to Admission medications    Medication Sig Start Date End Date Taking? Authorizing Provider   amLODIPine (NORVASC) 5 MG tablet TAKE 1 TABLET DAILY 8/17/21   Cathy Lebron MD   aspirin 81 MG chewable tablet Chew 81 mg Daily.    Provider, MD Yaquelin   levothyroxine (SYNTHROID, LEVOTHROID) 50 MCG tablet TAKE 1 TABLET DAILY 9/7/21   Cathy Lebron MD   losartan (Cozaar) 50 MG tablet Take 1 tablet by mouth Daily. 8/12/21   Cathy Lebron MD   methocarbamol (Robaxin) 500 MG tablet Take 1 tablet by mouth 4 (Four) Times a Day. 12/21/21   Cathy Lebron MD   Polyethyl Glyc-Propyl Glyc PF (SYSTANE PF) 0.4-0.3 % solution ophthalmic solution Administer 1 drop to both eyes Daily.    Provider, MD Yaquelin   predniSONE (DELTASONE) 20 MG tablet One p.o.  "twice daily x5 days then one p.o. daily x5 days 12/21/21   Cathy Lebron MD   TIMOLOL MALEATE OP Apply  to eye(s) as directed by provider.    Provider, MD Yaquelin        Social History:   Social History     Tobacco Use   • Smoking status: Never Smoker   • Smokeless tobacco: Never Used   Vaping Use   • Vaping Use: Never used   Substance Use Topics   • Alcohol use: Not Currently   • Drug use: Never       Review of Systems:  Review of Systems   Constitutional: Negative.    HENT: Negative.    Eyes: Negative.    Respiratory: Negative.    Cardiovascular: Negative.    Gastrointestinal: Negative.    Endocrine: Negative.    Genitourinary: Negative.    Musculoskeletal: Positive for arthralgias, back pain and gait problem.   Skin: Negative.    Allergic/Immunologic: Negative.    Neurological: Negative for weakness and numbness.   Hematological: Negative.    Psychiatric/Behavioral: Negative.         Physical Exam:  /83   Pulse 99   Temp 98.4 °F (36.9 °C) (Oral)   Resp 24   Ht 167.6 cm (66\")   Wt 62.8 kg (138 lb 7.2 oz)   SpO2 97%   BMI 22.35 kg/m²     Physical Exam  Vitals and nursing note reviewed.   Constitutional:       Appearance: Normal appearance.   HENT:      Head: Normocephalic.   Cardiovascular:      Rate and Rhythm: Normal rate and regular rhythm.      Heart sounds: Normal heart sounds.   Pulmonary:      Effort: Pulmonary effort is normal.      Breath sounds: Normal breath sounds.   Abdominal:      General: Bowel sounds are normal.      Palpations: Abdomen is soft.   Musculoskeletal:         General: Normal range of motion.      Cervical back: Normal range of motion and neck supple.   Skin:     General: Skin is warm and dry.   Neurological:      General: No focal deficit present.      Mental Status: She is alert and oriented to person, place, and time.   Psychiatric:         Mood and Affect: Mood normal.                Medications in the Emergency Department:  Medications   HYDROmorphone " (DILAUDID) injection 0.5 mg (0.5 mg Intramuscular Given 1/8/22 8713)        Labs  Lab Results (last 24 hours)     ** No results found for the last 24 hours. **           Imaging:  No Radiology Exams Resulted Within Past 24 Hours    Procedures:  Procedures    Progress                            Medical Decision Making:  MDM     Final diagnoses:   Right hip pain   Urinary frequency        Disposition:  ED Disposition     ED Disposition Condition Comment    Discharge Stable           Documentation assistance provided by Arnold Ayers DO acting as scribe for Anrold Ayers DO. Information recorded by the scribe was done at my direction and has been verified and validated by me.        Arnold Ayers DO  01/11/22 8692

## 2022-01-09 NOTE — DISCHARGE INSTRUCTIONS
Follow-up with Dr. Barbosa, orthopedics, regarding your hip pain.  Follow-up with Dr. Mejia, urology, regarding your urinary frequency.

## 2022-01-09 NOTE — ED NOTES
Patient requested to have leads removed, did not want to be monitored any longer. Leads removed      Ilene Perez, MIRIAN  01/08/22 1648

## 2022-01-10 ENCOUNTER — DOCUMENTATION (OUTPATIENT)
Dept: FAMILY MEDICINE CLINIC | Age: 87
End: 2022-01-10

## 2022-01-10 ENCOUNTER — TELEPHONE (OUTPATIENT)
Dept: FAMILY MEDICINE CLINIC | Age: 87
End: 2022-01-10

## 2022-01-10 NOTE — PROGRESS NOTES
The patient called me over the weekend, on Saturday, with increasing low back pain and hip pain to the point where she was unable to stand.  She reported having been in a chair for about 18 hours.  She reported her pain being intolerable.  She lives at home by herself.  After discussing with her and her daughter, I recommended they contact EMS to take her to be evaluated in the emergency department.  It looks like she was treated and released from [facility].  I still have significant concerns about her ability to care for herself at home.  Please reach out to the patient and/or her daughter, Madelyn, and see how she is doing.  I believe she normally sees Dr. Lebron.  Thanks.

## 2022-01-10 NOTE — TELEPHONE ENCOUNTER
Caller: BREA    Relationship: Emergency Contact    Best call back number: [phone]    What was the call regarding: PATIENTS DAUGHTER CALLED STATING SHE NEEDS A CALL BACK TODAY TO DISCUSS GETTING A PEER TO PEER WITH DR ELLSWORTH OR DR JOHNSON PER PATIENTS INSURANCE IN ORDER TO GET THE PATIENT ADMITTED INTO [facility] ASAP    Do you require a callback: YES PLEASE CALL AND ADVISE

## 2022-01-10 NOTE — TELEPHONE ENCOUNTER
I spoke with channing kelly and she has talked with [facility]  I saw the message just now- I have not heard of that before, but I called Magalie at [facility] just now and she said that she JUST now sent the insurance paperwork off, so she is thinking that daughter is 'jumping the gun' with calling and requesting a peer to peer. She said a peer to peer is where our Medical Director talks with the Insurance Medical Director to make sure patient meets criteria for admission. I am not familiar with this, but that's what Magalie at [facility] said. She said typically she sends the insurance information off and this is a part of admission but she's not there yet...

## 2022-01-11 ENCOUNTER — TELEMEDICINE (OUTPATIENT)
Dept: FAMILY MEDICINE CLINIC | Age: 87
End: 2022-01-11

## 2022-01-11 DIAGNOSIS — R29.898 WEAKNESS OF RIGHT LOWER EXTREMITY: ICD-10-CM

## 2022-01-11 DIAGNOSIS — M54.41 CHRONIC RIGHT-SIDED LOW BACK PAIN WITH RIGHT-SIDED SCIATICA: Primary | ICD-10-CM

## 2022-01-11 DIAGNOSIS — R32 URINARY INCONTINENCE, UNSPECIFIED TYPE: ICD-10-CM

## 2022-01-11 DIAGNOSIS — M25.551 RIGHT HIP PAIN: ICD-10-CM

## 2022-01-11 DIAGNOSIS — G89.29 CHRONIC RIGHT-SIDED LOW BACK PAIN WITH RIGHT-SIDED SCIATICA: Primary | ICD-10-CM

## 2022-01-11 PROCEDURE — 99214 OFFICE O/P EST MOD 30 MIN: CPT | Performed by: FAMILY MEDICINE

## 2022-01-11 RX ORDER — HYDROCODONE BITARTRATE AND ACETAMINOPHEN 5; 325 MG/1; MG/1
1 TABLET ORAL EVERY 6 HOURS PRN
Qty: 30 TABLET | Refills: 0 | Status: SHIPPED | OUTPATIENT
Start: 2022-01-11

## 2022-01-11 NOTE — PROGRESS NOTES
Tracy Brooks presents to [facility].    Chief Complaint: SEVERE BACK PAINI  Subjective       History of Present Illness:  HPI  Tracy is suffering from severe back pain.  She called the office on Sat and spoke with Dr Davis and said she called him on Saturday, with increasing low back pain and hip pain to the point where she was unable to stand.  She reported having been in a chair for about 18 hours.  She reported her pain being intolerable.  She lives at home by herself.  After discussing with her and her daughter, family was told to contact EMS to take her to be evaluated in the emergency department. Her LBP onset months ago and her pain is now severe and she is completely immobile and she has significant weakness in LE.  Her family took her to the ER on Saturday when her pain became severe and she was unable to stand up on her own.  Xray R hip showed DJD and no evidence of acute displaced fracture. Xray hip L spine multilevel degenerative change with scoliosis.  She did get a R hip steroid injection and sent her home on hydrocodone and sent home on prednisone 20 bid x 3 days.  The hydrocodone has helped.   She needs a direct admit to [facility] for PT/OT and nursing.    She is also having urinary incontinence with frequent urination.    She also has poor short term memory and repeats herself        Review of Systems:  Review of Systems   Constitutional: Positive for fatigue. Negative for chills and fever.   HENT: Negative for ear pain, sinus pressure and sore throat.    Eyes: Negative for blurred vision and double vision.   Respiratory: Negative for cough, shortness of breath and wheezing.    Cardiovascular: Negative for chest pain and palpitations.   Gastrointestinal: Negative for abdominal pain, blood in stool, constipation, diarrhea, nausea and vomiting.   Skin: Negative for rash.   Neurological: Negative for dizziness and headache.        Objective   Medical  History:  Past Medical History:   • Cancer (HCC)   • Compression fracture of T8 vertebra (HCC)   • Disease of thyroid gland   • Hyperlipidemia   • Hypertension   • Macular degeneration     Past Surgical History:   • CAROTID STENT   • COLON RESECTION   • MASTECTOMY      Family History   Problem Relation Age of Onset   • Colon cancer Mother    • Stroke Father    • Hyperlipidemia Sister    • Hyperlipidemia Brother    • Ovarian cancer Maternal Grandmother      Social History     Tobacco Use   • Smoking status: Never Smoker   • Smokeless tobacco: Never Used   Substance Use Topics   • Alcohol use: Not Currently       Health Maintenance Due   Topic Date Due   • COVID-19 Vaccine (3 - Booster for Moderna series) 10/14/2021        Immunization History   Administered Date(s) Administered   • COVID-19 (MODERNA) 1st, 2nd, 3rd Dose Only 03/17/2021, 04/14/2021       Allergies   Allergen Reactions   • Codeine Provider Review Needed   • Azithromycin Rash   • Clindamycin Rash   • Penicillins Rash   • Sulfamethoxazole-Trimethoprim Rash        Medications:  Current Outpatient Medications on File Prior to Visit   Medication Sig   • amLODIPine (NORVASC) 5 MG tablet TAKE 1 TABLET DAILY   • aspirin 81 MG chewable tablet Chew 81 mg Daily.   • levothyroxine (SYNTHROID, LEVOTHROID) 50 MCG tablet TAKE 1 TABLET DAILY   • losartan (Cozaar) 50 MG tablet Take 1 tablet by mouth Daily.   • methocarbamol (Robaxin) 500 MG tablet Take 1 tablet by mouth 4 (Four) Times a Day.   • Polyethyl Glyc-Propyl Glyc PF (SYSTANE PF) 0.4-0.3 % solution ophthalmic solution Administer 1 drop to both eyes Daily.   • predniSONE (DELTASONE) 20 MG tablet One p.o. twice daily x5 days then one p.o. daily x5 days   • predniSONE (DELTASONE) 20 MG tablet Take 1 tablet by mouth 2 (Two) Times a Day.   • TIMOLOL MALEATE OP Apply  to eye(s) as directed by provider.   • [DISCONTINUED] HYDROcodone-acetaminophen (NORCO) 5-325 MG per tablet Take 1 tablet by mouth Every 6 (Six) Hours  As Needed for Moderate Pain .     No current facility-administered medications on file prior to visit.       Vital Signs:   There were no vitals taken for this visit.      Physical Exam:  Physical Exam  Vitals reviewed.   Constitutional:       General: She is not in acute distress.     Appearance: Normal appearance. She is normal weight. She is not ill-appearing.   HENT:      Head: Normocephalic and atraumatic.   Eyes:      Extraocular Movements: Extraocular movements intact.      Pupils: Pupils are equal, round, and reactive to light.   Pulmonary:      Effort: Pulmonary effort is normal.   Neurological:      General: No focal deficit present.      Mental Status: She is alert and oriented to person, place, and time.   Psychiatric:         Mood and Affect: Mood normal.         Behavior: Behavior normal.         Thought Content: Thought content normal.         Judgment: Judgment normal.         Result Review      The following data was reviewed by Cathy Lebron MD on 01/11/2022.  Lab Results   Component Value Date    WBC 5.81 11/30/2021    HGB 13.1 11/30/2021    HCT 40.2 11/30/2021    MCV 98.3 (H) 11/30/2021     11/30/2021     Lab Results   Component Value Date    GLUCOSE 88 11/30/2021    BUN 21 11/30/2021    CREATININE 0.93 11/30/2021    EGFRIFNONA 57 (L) 11/30/2021    BCR 22.6 11/30/2021    K 4.2 11/30/2021    CO2 24.6 11/30/2021    CALCIUM 10.7 (H) 11/30/2021    ALBUMIN 4.40 11/30/2021    LABIL2 1.2 (L) 06/01/2021    AST 24 11/30/2021    ALT 21 11/30/2021     Lab Results   Component Value Date    CHLPL 265 (H) 08/06/2020    TRIG 75 08/06/2020    HDL 72 (H) 08/06/2020     (H) 08/06/2020     Lab Results   Component Value Date    TSH 2.000 11/30/2021     No results found for: HGBA1C  No results found for: PSA                    Assessment and Plan:          Diagnoses and all orders for this visit:    1. Chronic right-sided low back pain with right-sided sciatica (Primary)  Comments:  referral for  admit for PT/OT and nursing to [facility]    2. Weakness of right lower extremity    3. Urinary incontinence, unspecified type    4. Right hip pain  -     HYDROcodone-acetaminophen (NORCO) 5-325 MG per tablet; Take 1 tablet by mouth Every 6 (Six) Hours As Needed for Moderate Pain .  Dispense: 30 tablet; Refill: 0          Follow Up   F/u when she is d/c from [facility].

## 2022-01-12 ENCOUNTER — TELEPHONE (OUTPATIENT)
Dept: FAMILY MEDICINE CLINIC | Age: 87
End: 2022-01-12

## 2022-01-12 NOTE — TELEPHONE ENCOUNTER
DR Lopez did a telehealth visit yesterday can you check the status of her admission to [facility] and see if they need me to fax them the visit note

## 2022-01-12 NOTE — TELEPHONE ENCOUNTER
OV faxed to [employer] at  I called and spoke with brian and it is a new company policy that there cooperate has to given insurance auth for the insurance and  She submitted that on Monday and she is sorry it is taking so long but she can not do anything until she gets that . I asked her if there is anything that we  and she said no . The only other option is for them to do private pay .Pt's daughter inf of above and that she will need to call brian at [facility] for updates

## 2022-01-21 DIAGNOSIS — I10 ESSENTIAL HYPERTENSION: ICD-10-CM

## 2022-01-21 RX ORDER — LOSARTAN POTASSIUM 50 MG/1
TABLET ORAL
Qty: 90 TABLET | Refills: 0 | Status: SHIPPED | OUTPATIENT
Start: 2022-01-21

## 2022-02-09 ENCOUNTER — TELEPHONE (OUTPATIENT)
Dept: FAMILY MEDICINE CLINIC | Age: 87
End: 2022-02-09

## 2022-02-09 NOTE — TELEPHONE ENCOUNTER
[facility] [place]  and they will need to order this at discharge  I spoke with Pia and she has already ordered this for the pt

## 2022-02-09 NOTE — TELEPHONE ENCOUNTER
Caller: rafaela carpenter    Relationship: Emergency Contact        What orders are you requesting (i.e. lab or imaging): RAISED TOILET SEAT     In what timeframe would the patient need to come in: ASAP     Where will you receive your lab/imaging services: [facility]  [phone]